# Patient Record
Sex: MALE | Race: BLACK OR AFRICAN AMERICAN | Employment: FULL TIME | URBAN - METROPOLITAN AREA
[De-identification: names, ages, dates, MRNs, and addresses within clinical notes are randomized per-mention and may not be internally consistent; named-entity substitution may affect disease eponyms.]

---

## 2017-01-03 ENCOUNTER — HOSPITAL ENCOUNTER (INPATIENT)
Age: 37
LOS: 2 days | Discharge: HOME | DRG: 247 | End: 2017-01-05
Attending: EMERGENCY MEDICINE | Admitting: INTERNAL MEDICINE
Payer: COMMERCIAL

## 2017-01-03 ENCOUNTER — APPOINTMENT (OUTPATIENT)
Dept: GENERAL RADIOLOGY | Age: 37
DRG: 247 | End: 2017-01-03
Payer: COMMERCIAL

## 2017-01-03 DIAGNOSIS — I24.9 ACS (ACUTE CORONARY SYNDROME) (HCC): Primary | ICD-10-CM

## 2017-01-03 PROBLEM — E78.5 HYPERLIPIDEMIA: Status: ACTIVE | Noted: 2017-01-03

## 2017-01-03 PROBLEM — I21.4 NSTEMI (NON-ST ELEVATED MYOCARDIAL INFARCTION) (HCC): Status: ACTIVE | Noted: 2017-01-03

## 2017-01-03 PROBLEM — R94.31 ABNORMAL EKG: Status: ACTIVE | Noted: 2017-01-03

## 2017-01-03 PROBLEM — E66.9 OBESITY: Status: ACTIVE | Noted: 2017-01-03

## 2017-01-03 PROBLEM — J45.909 ASTHMA: Status: ACTIVE | Noted: 2017-01-03

## 2017-01-03 LAB
ALBUMIN SERPL BCP-MCNC: 4 G/DL (ref 3.5–5)
ALBUMIN/GLOB SERPL: 1 {RATIO} (ref 1.2–3.5)
ALP SERPL-CCNC: 103 U/L (ref 50–136)
ALT SERPL-CCNC: 30 U/L (ref 12–65)
ANION GAP BLD CALC-SCNC: 9 MMOL/L (ref 7–16)
AST SERPL W P-5'-P-CCNC: 36 U/L (ref 15–37)
ATRIAL RATE: 66 BPM
BASOPHILS # BLD AUTO: 0 K/UL (ref 0–0.2)
BASOPHILS # BLD: 0 % (ref 0–2)
BILIRUB SERPL-MCNC: 0.6 MG/DL (ref 0.2–1.1)
BUN SERPL-MCNC: 22 MG/DL (ref 6–23)
CALCIUM SERPL-MCNC: 8.9 MG/DL (ref 8.3–10.4)
CALCULATED P AXIS, ECG09: 48 DEGREES
CALCULATED R AXIS, ECG10: -14 DEGREES
CALCULATED T AXIS, ECG11: -17 DEGREES
CHLORIDE SERPL-SCNC: 101 MMOL/L (ref 98–107)
CO2 SERPL-SCNC: 29 MMOL/L (ref 21–32)
CREAT SERPL-MCNC: 1.23 MG/DL (ref 0.8–1.5)
DIAGNOSIS, 93000: NORMAL
DIASTOLIC BP, ECG02: NORMAL MMHG
DIFFERENTIAL METHOD BLD: ABNORMAL
EOSINOPHIL # BLD: 0 K/UL (ref 0–0.8)
EOSINOPHIL NFR BLD: 0 % (ref 0.5–7.8)
ERYTHROCYTE [DISTWIDTH] IN BLOOD BY AUTOMATED COUNT: 12.5 % (ref 11.9–14.6)
GLOBULIN SER CALC-MCNC: 4 G/DL (ref 2.3–3.5)
GLUCOSE SERPL-MCNC: 87 MG/DL (ref 65–100)
HCT VFR BLD AUTO: 40.4 % (ref 41.1–50.3)
HGB BLD-MCNC: 13.2 G/DL (ref 13.6–17.2)
IMM GRANULOCYTES # BLD: 0 K/UL (ref 0–0.5)
IMM GRANULOCYTES NFR BLD AUTO: 0.3 % (ref 0–5)
LYMPHOCYTES # BLD AUTO: 29 % (ref 13–44)
LYMPHOCYTES # BLD: 3.5 K/UL (ref 0.5–4.6)
MCH RBC QN AUTO: 28.1 PG (ref 26.1–32.9)
MCHC RBC AUTO-ENTMCNC: 32.7 G/DL (ref 31.4–35)
MCV RBC AUTO: 86 FL (ref 79.6–97.8)
MONOCYTES # BLD: 0.9 K/UL (ref 0.1–1.3)
MONOCYTES NFR BLD AUTO: 7 % (ref 4–12)
NEUTS SEG # BLD: 7.3 K/UL (ref 1.7–8.2)
NEUTS SEG NFR BLD AUTO: 64 % (ref 43–78)
P-R INTERVAL, ECG05: 204 MS
PLATELET # BLD AUTO: 389 K/UL (ref 150–450)
PMV BLD AUTO: 8.3 FL (ref 10.8–14.1)
POTASSIUM SERPL-SCNC: 3.5 MMOL/L (ref 3.5–5.1)
PROT SERPL-MCNC: 8 G/DL (ref 6.3–8.2)
Q-T INTERVAL, ECG07: 414 MS
QRS DURATION, ECG06: 96 MS
QTC CALCULATION (BEZET), ECG08: 434 MS
RBC # BLD AUTO: 4.7 M/UL (ref 4.23–5.67)
SODIUM SERPL-SCNC: 139 MMOL/L (ref 136–145)
SYSTOLIC BP, ECG01: NORMAL MMHG
TROPONIN I SERPL-MCNC: 3.04 NG/ML (ref 0.02–0.05)
TROPONIN I SERPL-MCNC: 5.18 NG/ML (ref 0.02–0.05)
TSH SERPL DL<=0.005 MIU/L-ACNC: 1.31 UIU/ML (ref 0.36–3.74)
VENTRICULAR RATE, ECG03: 66 BPM
WBC # BLD AUTO: 11.8 K/UL (ref 4.3–11.1)

## 2017-01-03 PROCEDURE — 74011250636 HC RX REV CODE- 250/636

## 2017-01-03 PROCEDURE — 77030015766

## 2017-01-03 PROCEDURE — C1757 CATH, THROMBECTOMY/EMBOLECT: HCPCS

## 2017-01-03 PROCEDURE — 74011250636 HC RX REV CODE- 250/636: Performed by: EMERGENCY MEDICINE

## 2017-01-03 PROCEDURE — 74011250636 HC RX REV CODE- 250/636: Performed by: NURSE PRACTITIONER

## 2017-01-03 PROCEDURE — 74011250637 HC RX REV CODE- 250/637: Performed by: EMERGENCY MEDICINE

## 2017-01-03 PROCEDURE — 02C03ZZ EXTIRPATION OF MATTER FROM CORONARY ARTERY, ONE ARTERY, PERCUTANEOUS APPROACH: ICD-10-PCS | Performed by: INTERNAL MEDICINE

## 2017-01-03 PROCEDURE — B2151ZZ FLUOROSCOPY OF LEFT HEART USING LOW OSMOLAR CONTRAST: ICD-10-PCS | Performed by: INTERNAL MEDICINE

## 2017-01-03 PROCEDURE — 74011000250 HC RX REV CODE- 250: Performed by: INTERNAL MEDICINE

## 2017-01-03 PROCEDURE — 93005 ELECTROCARDIOGRAM TRACING: CPT | Performed by: INTERNAL MEDICINE

## 2017-01-03 PROCEDURE — 74011636320 HC RX REV CODE- 636/320: Performed by: INTERNAL MEDICINE

## 2017-01-03 PROCEDURE — 92973 PRQ TRLUML C MCHN ASP THRMBC: CPT

## 2017-01-03 PROCEDURE — 93005 ELECTROCARDIOGRAM TRACING: CPT

## 2017-01-03 PROCEDURE — C1769 GUIDE WIRE: HCPCS

## 2017-01-03 PROCEDURE — 74011250636 HC RX REV CODE- 250/636: Performed by: INTERNAL MEDICINE

## 2017-01-03 PROCEDURE — C1874 STENT, COATED/COV W/DEL SYS: HCPCS

## 2017-01-03 PROCEDURE — 96365 THER/PROPH/DIAG IV INF INIT: CPT

## 2017-01-03 PROCEDURE — 027135Z DILATION OF CORONARY ARTERY, TWO ARTERIES WITH TWO DRUG-ELUTING INTRALUMINAL DEVICES, PERCUTANEOUS APPROACH: ICD-10-PCS | Performed by: INTERNAL MEDICINE

## 2017-01-03 PROCEDURE — 96375 TX/PRO/DX INJ NEW DRUG ADDON: CPT

## 2017-01-03 PROCEDURE — 65660000000 HC RM CCU STEPDOWN

## 2017-01-03 PROCEDURE — 84443 ASSAY THYROID STIM HORMONE: CPT | Performed by: NURSE PRACTITIONER

## 2017-01-03 PROCEDURE — 71020 XR CHEST PA LAT: CPT

## 2017-01-03 PROCEDURE — 85347 COAGULATION TIME ACTIVATED: CPT | Performed by: INTERNAL MEDICINE

## 2017-01-03 PROCEDURE — 77030012468 HC VLV BLEEDBK CNTRL ABBT -B

## 2017-01-03 PROCEDURE — 80053 COMPREHEN METABOLIC PANEL: CPT

## 2017-01-03 PROCEDURE — C1887 CATHETER, GUIDING: HCPCS

## 2017-01-03 PROCEDURE — C1894 INTRO/SHEATH, NON-LASER: HCPCS

## 2017-01-03 PROCEDURE — 74011250637 HC RX REV CODE- 250/637: Performed by: INTERNAL MEDICINE

## 2017-01-03 PROCEDURE — 77030029997 HC DEV COM RDL R BND TELE -B

## 2017-01-03 PROCEDURE — 99285 EMERGENCY DEPT VISIT HI MDM: CPT

## 2017-01-03 PROCEDURE — 84484 ASSAY OF TROPONIN QUANT: CPT

## 2017-01-03 PROCEDURE — 74011250637 HC RX REV CODE- 250/637: Performed by: NURSE PRACTITIONER

## 2017-01-03 PROCEDURE — 92928 PRQ TCAT PLMT NTRAC ST 1 LES: CPT

## 2017-01-03 PROCEDURE — 92929 HC PLC DE STNT +/-PTA MAJOR COR VESL/BRNCH  ADD RC: CPT

## 2017-01-03 PROCEDURE — 4A023N7 MEASUREMENT OF CARDIAC SAMPLING AND PRESSURE, LEFT HEART, PERCUTANEOUS APPROACH: ICD-10-PCS | Performed by: INTERNAL MEDICINE

## 2017-01-03 PROCEDURE — B2111ZZ FLUOROSCOPY OF MULTIPLE CORONARY ARTERIES USING LOW OSMOLAR CONTRAST: ICD-10-PCS | Performed by: INTERNAL MEDICINE

## 2017-01-03 PROCEDURE — 77030004559 HC CATH ANGI DX SUPT CARD -B

## 2017-01-03 PROCEDURE — 36415 COLL VENOUS BLD VENIPUNCTURE: CPT | Performed by: NURSE PRACTITIONER

## 2017-01-03 PROCEDURE — 85025 COMPLETE CBC W/AUTO DIFF WBC: CPT

## 2017-01-03 PROCEDURE — 3E073PZ INTRODUCTION OF PLATELET INHIBITOR INTO CORONARY ARTERY, PERCUTANEOUS APPROACH: ICD-10-PCS | Performed by: INTERNAL MEDICINE

## 2017-01-03 PROCEDURE — 93458 L HRT ARTERY/VENTRICLE ANGIO: CPT

## 2017-01-03 PROCEDURE — 77030004558 HC CATH ANGI DX SUPR TORQ CARD -A

## 2017-01-03 PROCEDURE — C1725 CATH, TRANSLUMIN NON-LASER: HCPCS

## 2017-01-03 RX ORDER — SODIUM CHLORIDE 0.9 % (FLUSH) 0.9 %
5-10 SYRINGE (ML) INJECTION AS NEEDED
Status: DISCONTINUED | OUTPATIENT
Start: 2017-01-03 | End: 2017-01-05 | Stop reason: HOSPADM

## 2017-01-03 RX ORDER — SODIUM NITROPRUSSIDE 25 MG/ML
50 INJECTION INTRAVENOUS
Status: DISCONTINUED | OUTPATIENT
Start: 2017-01-03 | End: 2017-01-03 | Stop reason: HOSPADM

## 2017-01-03 RX ORDER — MIDAZOLAM HYDROCHLORIDE 1 MG/ML
.5-5 INJECTION, SOLUTION INTRAMUSCULAR; INTRAVENOUS
Status: DISCONTINUED | OUTPATIENT
Start: 2017-01-03 | End: 2017-01-03 | Stop reason: HOSPADM

## 2017-01-03 RX ORDER — PRASUGREL 10 MG/1
60 TABLET, FILM COATED ORAL ONCE
Status: COMPLETED | OUTPATIENT
Start: 2017-01-03 | End: 2017-01-03

## 2017-01-03 RX ORDER — GUAIFENESIN 100 MG/5ML
81 LIQUID (ML) ORAL DAILY
Status: DISCONTINUED | OUTPATIENT
Start: 2017-01-04 | End: 2017-01-05 | Stop reason: HOSPADM

## 2017-01-03 RX ORDER — SODIUM CHLORIDE 0.9 % (FLUSH) 0.9 %
5-10 SYRINGE (ML) INJECTION AS NEEDED
Status: DISCONTINUED | OUTPATIENT
Start: 2017-01-03 | End: 2017-01-03 | Stop reason: HOSPADM

## 2017-01-03 RX ORDER — GUAIFENESIN 100 MG/5ML
324 LIQUID (ML) ORAL ONCE
Status: COMPLETED | OUTPATIENT
Start: 2017-01-03 | End: 2017-01-03

## 2017-01-03 RX ORDER — ACETAMINOPHEN 325 MG/1
650 TABLET ORAL
Status: DISCONTINUED | OUTPATIENT
Start: 2017-01-03 | End: 2017-01-05 | Stop reason: HOSPADM

## 2017-01-03 RX ORDER — LIDOCAINE HYDROCHLORIDE 20 MG/ML
1-20 INJECTION, SOLUTION INFILTRATION; PERINEURAL
Status: DISCONTINUED | OUTPATIENT
Start: 2017-01-03 | End: 2017-01-03 | Stop reason: HOSPADM

## 2017-01-03 RX ORDER — EPTIFIBATIDE 0.75 MG/ML
2 INJECTION, SOLUTION INTRAVENOUS CONTINUOUS
Status: DISCONTINUED | OUTPATIENT
Start: 2017-01-03 | End: 2017-01-05 | Stop reason: HOSPADM

## 2017-01-03 RX ORDER — HEPARIN SODIUM 10000 [USP'U]/ML
40-80 INJECTION, SOLUTION INTRAVENOUS; SUBCUTANEOUS
Status: DISCONTINUED | OUTPATIENT
Start: 2017-01-03 | End: 2017-01-03 | Stop reason: HOSPADM

## 2017-01-03 RX ORDER — ATORVASTATIN CALCIUM 40 MG/1
80 TABLET, FILM COATED ORAL
Status: DISCONTINUED | OUTPATIENT
Start: 2017-01-03 | End: 2017-01-03

## 2017-01-03 RX ORDER — SODIUM CHLORIDE 9 MG/ML
75 INJECTION, SOLUTION INTRAVENOUS CONTINUOUS
Status: DISPENSED | OUTPATIENT
Start: 2017-01-03 | End: 2017-01-04

## 2017-01-03 RX ORDER — MORPHINE SULFATE 2 MG/ML
2 INJECTION, SOLUTION INTRAMUSCULAR; INTRAVENOUS
Status: DISCONTINUED | OUTPATIENT
Start: 2017-01-03 | End: 2017-01-05 | Stop reason: HOSPADM

## 2017-01-03 RX ORDER — LISINOPRIL 5 MG/1
5 TABLET ORAL 2 TIMES DAILY
Status: DISCONTINUED | OUTPATIENT
Start: 2017-01-03 | End: 2017-01-04

## 2017-01-03 RX ORDER — HEPARIN SODIUM 5000 [USP'U]/100ML
12-25 INJECTION, SOLUTION INTRAVENOUS
Status: DISCONTINUED | OUTPATIENT
Start: 2017-01-03 | End: 2017-01-05 | Stop reason: HOSPADM

## 2017-01-03 RX ORDER — HEPARIN SODIUM 5000 [USP'U]/100ML
12-25 INJECTION, SOLUTION INTRAVENOUS
Status: DISCONTINUED | OUTPATIENT
Start: 2017-01-03 | End: 2017-01-03 | Stop reason: SDUPTHER

## 2017-01-03 RX ORDER — ATORVASTATIN CALCIUM 40 MG/1
80 TABLET, FILM COATED ORAL DAILY
Status: DISCONTINUED | OUTPATIENT
Start: 2017-01-03 | End: 2017-01-05 | Stop reason: HOSPADM

## 2017-01-03 RX ORDER — SODIUM CHLORIDE 0.9 % (FLUSH) 0.9 %
5-10 SYRINGE (ML) INJECTION EVERY 8 HOURS
Status: DISCONTINUED | OUTPATIENT
Start: 2017-01-03 | End: 2017-01-03 | Stop reason: HOSPADM

## 2017-01-03 RX ORDER — NITROGLYCERIN 0.4 MG/1
0.4 TABLET SUBLINGUAL
Status: DISCONTINUED | OUTPATIENT
Start: 2017-01-03 | End: 2017-01-03

## 2017-01-03 RX ORDER — SODIUM CHLORIDE 9 MG/ML
100 INJECTION, SOLUTION INTRAVENOUS CONTINUOUS
Status: DISCONTINUED | OUTPATIENT
Start: 2017-01-03 | End: 2017-01-03 | Stop reason: HOSPADM

## 2017-01-03 RX ORDER — MORPHINE SULFATE 2 MG/ML
2 INJECTION, SOLUTION INTRAMUSCULAR; INTRAVENOUS
Status: DISCONTINUED | OUTPATIENT
Start: 2017-01-03 | End: 2017-01-03 | Stop reason: HOSPADM

## 2017-01-03 RX ORDER — SODIUM CHLORIDE 0.9 % (FLUSH) 0.9 %
5-10 SYRINGE (ML) INJECTION EVERY 8 HOURS
Status: DISCONTINUED | OUTPATIENT
Start: 2017-01-03 | End: 2017-01-05 | Stop reason: HOSPADM

## 2017-01-03 RX ORDER — FENTANYL CITRATE 50 UG/ML
25-100 INJECTION, SOLUTION INTRAMUSCULAR; INTRAVENOUS
Status: DISCONTINUED | OUTPATIENT
Start: 2017-01-03 | End: 2017-01-03 | Stop reason: HOSPADM

## 2017-01-03 RX ORDER — LORAZEPAM 1 MG/1
1 TABLET ORAL
Status: DISCONTINUED | OUTPATIENT
Start: 2017-01-03 | End: 2017-01-05 | Stop reason: HOSPADM

## 2017-01-03 RX ORDER — ALBUTEROL SULFATE 90 UG/1
2 AEROSOL, METERED RESPIRATORY (INHALATION)
COMMUNITY

## 2017-01-03 RX ORDER — NITROGLYCERIN 0.4 MG/1
0.4 TABLET SUBLINGUAL
Status: DISCONTINUED | OUTPATIENT
Start: 2017-01-03 | End: 2017-01-05 | Stop reason: HOSPADM

## 2017-01-03 RX ORDER — HEPARIN SODIUM 200 [USP'U]/100ML
3 INJECTION, SOLUTION INTRAVENOUS CONTINUOUS
Status: DISCONTINUED | OUTPATIENT
Start: 2017-01-03 | End: 2017-01-03 | Stop reason: HOSPADM

## 2017-01-03 RX ORDER — HEPARIN SODIUM 5000 [USP'U]/ML
4000 INJECTION, SOLUTION INTRAVENOUS; SUBCUTANEOUS
Status: COMPLETED | OUTPATIENT
Start: 2017-01-03 | End: 2017-01-03

## 2017-01-03 RX ORDER — EPTIFIBATIDE 2 MG/ML
180 INJECTION, SOLUTION INTRAVENOUS
Status: DISCONTINUED | OUTPATIENT
Start: 2017-01-03 | End: 2017-01-03 | Stop reason: HOSPADM

## 2017-01-03 RX ORDER — PRASUGREL 10 MG/1
10 TABLET, FILM COATED ORAL DAILY
Status: DISCONTINUED | OUTPATIENT
Start: 2017-01-04 | End: 2017-01-05 | Stop reason: HOSPADM

## 2017-01-03 RX ORDER — GUAIFENESIN 100 MG/5ML
81 LIQUID (ML) ORAL DAILY
Status: DISCONTINUED | OUTPATIENT
Start: 2017-01-04 | End: 2017-01-03 | Stop reason: SDUPTHER

## 2017-01-03 RX ORDER — METOPROLOL TARTRATE 25 MG/1
25 TABLET, FILM COATED ORAL 2 TIMES DAILY
Status: DISCONTINUED | OUTPATIENT
Start: 2017-01-03 | End: 2017-01-04

## 2017-01-03 RX ADMIN — EPTIFIBATIDE 2 MCG/KG/MIN: 0.75 INJECTION, SOLUTION INTRAVENOUS at 16:30

## 2017-01-03 RX ADMIN — MIDAZOLAM HYDROCHLORIDE 1 MG: 1 INJECTION, SOLUTION INTRAMUSCULAR; INTRAVENOUS at 15:03

## 2017-01-03 RX ADMIN — NITROGLYCERIN 1 INCH: 20 OINTMENT TOPICAL at 18:10

## 2017-01-03 RX ADMIN — ATORVASTATIN CALCIUM 80 MG: 40 TABLET, FILM COATED ORAL at 21:16

## 2017-01-03 RX ADMIN — EPTIFIBATIDE 2.9 MCG/KG/MIN: 0.75 INJECTION, SOLUTION INTRAVENOUS at 15:55

## 2017-01-03 RX ADMIN — MIDAZOLAM HYDROCHLORIDE 1 MG: 1 INJECTION, SOLUTION INTRAMUSCULAR; INTRAVENOUS at 14:50

## 2017-01-03 RX ADMIN — EPTIFIBATIDE 2.9 MCG/KG/MIN: 0.75 INJECTION, SOLUTION INTRAVENOUS at 15:31

## 2017-01-03 RX ADMIN — LIDOCAINE HYDROCHLORIDE 60 MG: 20 INJECTION, SOLUTION INFILTRATION; PERINEURAL at 14:55

## 2017-01-03 RX ADMIN — SODIUM NITROPRUSSIDE 0.01 MG: 50 INJECTION, SOLUTION, CONCENTRATE INTRAVENOUS at 16:34

## 2017-01-03 RX ADMIN — FENTANYL CITRATE 25 MCG: 50 INJECTION, SOLUTION INTRAMUSCULAR; INTRAVENOUS at 16:20

## 2017-01-03 RX ADMIN — HEPARIN SODIUM 2 ML: 10000 INJECTION, SOLUTION INTRAVENOUS; SUBCUTANEOUS at 14:55

## 2017-01-03 RX ADMIN — SODIUM CHLORIDE 100 ML/HR: 900 INJECTION, SOLUTION INTRAVENOUS at 14:48

## 2017-01-03 RX ADMIN — PRASUGREL HYDROCHLORIDE 60 MG: 10 TABLET, FILM COATED ORAL at 16:32

## 2017-01-03 RX ADMIN — HEPARIN SODIUM AND DEXTROSE 12 UNITS/KG/HR: 5000; 5 INJECTION INTRAVENOUS at 14:01

## 2017-01-03 RX ADMIN — HEPARIN SODIUM 2000 UNITS: 10000 INJECTION, SOLUTION INTRAVENOUS; SUBCUTANEOUS at 16:39

## 2017-01-03 RX ADMIN — HEPARIN SODIUM 5200 UNITS: 10000 INJECTION, SOLUTION INTRAVENOUS; SUBCUTANEOUS at 15:16

## 2017-01-03 RX ADMIN — LISINOPRIL 5 MG: 5 TABLET ORAL at 18:04

## 2017-01-03 RX ADMIN — HEPARIN SODIUM 2000 UNITS: 10000 INJECTION, SOLUTION INTRAVENOUS; SUBCUTANEOUS at 16:12

## 2017-01-03 RX ADMIN — HEPARIN SODIUM 3 ML/HR: 200 INJECTION, SOLUTION INTRAVENOUS at 14:56

## 2017-01-03 RX ADMIN — HEPARIN SODIUM 4000 UNITS: 5000 INJECTION, SOLUTION INTRAVENOUS; SUBCUTANEOUS at 14:00

## 2017-01-03 RX ADMIN — IOPAMIDOL 260 ML: 755 INJECTION, SOLUTION INTRAVENOUS at 16:41

## 2017-01-03 RX ADMIN — FENTANYL CITRATE 50 MCG: 50 INJECTION, SOLUTION INTRAMUSCULAR; INTRAVENOUS at 14:50

## 2017-01-03 RX ADMIN — Medication 5 ML: at 21:17

## 2017-01-03 RX ADMIN — METOPROLOL TARTRATE 25 MG: 25 TABLET ORAL at 18:05

## 2017-01-03 RX ADMIN — Medication 10 ML: at 18:16

## 2017-01-03 RX ADMIN — EPTIFIBATIDE 2 MCG/KG/MIN: 0.75 INJECTION, SOLUTION INTRAVENOUS at 22:01

## 2017-01-03 RX ADMIN — FENTANYL CITRATE 25 MCG: 50 INJECTION, SOLUTION INTRAMUSCULAR; INTRAVENOUS at 15:03

## 2017-01-03 RX ADMIN — ASPIRIN 81 MG 324 MG: 81 TABLET ORAL at 14:00

## 2017-01-03 RX ADMIN — EPTIFIBATIDE 1.93 MCG/KG/MIN: 0.75 INJECTION, SOLUTION INTRAVENOUS at 15:24

## 2017-01-03 RX ADMIN — NITROGLYCERIN 1 INCH: 20 OINTMENT TOPICAL at 13:59

## 2017-01-03 NOTE — PROGRESS NOTES
TRANSFER - IN REPORT:    Verbal report received from [place], RN on Whatley Forward  being received from [facility] for routine progression of care. Report consisted of patients Situation, Background, Assessment and   Recommendations(SBAR). Information from the following reports was reviewed: Kardex, Procedure Summary, MAR and Recent Results. Opportunity for questions and clarification was provided. Patient received to room 314 and connected to telemetry monitor. Assessment completed and plan of care reviewed. Right radial with TR band in place Site dry and intact without hematoma. BP cycling every 15 minutes. Patient oriented to room and call light. Patient voiced understanding to limit use of RUE. Patient voiced understanding to use call light to communicate needs. Admission skin assessment completed with second RN and reveals the following: Sacrum visualized, skin intact, no breakdown noted.

## 2017-01-03 NOTE — PROGRESS NOTES
TRANSFER - OUT REPORT:    Verbal report given to Edmundo Segura RN(name) on Jaden Quale  being transferred to [address] [place] [address](unit) for routine progression of care       Report consisted of patients Situation, Background, Assessment and   Recommendations(SBAR). Information from the following report(s) SBAR was reviewed with the receiving nurse.    has No Known Allergies. Opportunity for questions and clarification was provided. Procedure Summary:Pt had LHC with 2 stents placed in RCA via R wrist. Site sealed with R band using 10 ml at 1630 hrs.   Med Administration    Versed:  2 mg  Fentanyl: 100 mcg  Heparin: 9200 units  Integrilin: gtt and bolus x2    Visit Vitals    /80 (BP 1 Location: Left arm, BP Patient Position: Supine)    Pulse (!) 59    Temp 97.8 °F (36.6 °C)    Resp 16    Ht 6' 1\" (1.854 m)    Wt 129.3 kg (285 lb)    SpO2 100%    BMI 37.6 kg/m2     Past Medical History   Diagnosis Date    Asthma

## 2017-01-03 NOTE — PROCEDURES
Brief Cardiac Procedure Note    Patient: Radha Lopez MRN: [id number]  SSN: [id number]    YOB: 1980  Age: 31 y.o. Sex: male      Date of Procedure: 1/3/2017     Pre-procedure Diagnosis: Chest pain CCS Class IV/NSTEMI    Post-procedure Diagnosis: Coronary Artery Disease    Procedure: Left Heart Catheterization with Percutaneous Coronary Intervention    Brief Description of Procedure: ASPIRATION PDA AND PLB, PTCA STENT PDA, PTCA STENT PROX RCA    Performed By: Yocasta Yi MD     Assistants: NONE    Anesthesia: Moderate Sedation    Estimated Blood Loss: Less than 10 mL      Specimens: None    Implants: None    Findings:   LV 55-60% WITH MID MID INFERIOR HK, EDP 10-15, NO MR OR AV GRADIENT  LM, LAD, LCX MILD IRREGS  RCA HUGE WITH CLOT/70% STENOSIS PROX, OCCLUDED LARGE PDA    2.0 TREK PDA WITHOUT CHANGE  ASPIRATION X 2 WITH REMOVAL OF LARGE, LONG WHITE CLOT FROM PDA  SMALL PORTION OF CLOT WASHED INTO DISTAL MOST PLB, ASPIRATED TWICE WITHOUT IMPROVEMENT  STENTED 60% PDA LESION WITH 2.5 X 18 XIENCE, 2.75 TO 16, 16, 12 PROX TO DISTAL    STENTED PROX RCA 4.0 X 38 XIENCE TO 16  5. 0NC TO 12, 16, 16, 14 WITH GOOD RESULT  ARABELLA 3 FLOW DISTAL    RIGHT RADIAL  HEPARIN, EFFIENT, INTEGRILIN, ASA      Complications: None    Recommendations: Continue medical therapy.     Signed By: Yocasta Yi MD     January 3, 2017

## 2017-01-03 NOTE — PROGRESS NOTES
Bedside and Verbal shift change report received from [address]. Report included the following information SBAR, Kardex, ED Summary, Procedure Summary, Intake/Output, MAR, Recent Results and Cardiac Rhythm NSR.

## 2017-01-03 NOTE — ED PROVIDER NOTES
HPI Comments: 30-year-old gentleman with a history of pain in the left side of his chest that goes down his left arm that he says started last night. Patient says he saw his primary care doctor a few days ago and was diagnosed with a respiratory infection that was treated with azithromycin and steroids. He said he had a breathing treatment at that time as well. He says when he saw his primary care doctor he was not having any pain. He notes this pain that started last night and was a 1 or a 2 out of 10 and he says it has not gotten any more intense. Patient says that since his pain persisted he wanted to come in and get evaluated. Currently he says it is still a 1 or 2 out of 10. Patient says that his pain did not get any worse with walking from the car to the emergency department or walking in the emergency department. He says it did not seem to be relieved by rest.    Elements of this note were created using speech recognition software. As such, errors of speech recognition may be present. Patient is a 31 y.o. male presenting with palpitations. The history is provided by the patient. Palpitations    Associated symptoms include chest pain. Pertinent negatives include no diaphoresis, no fever, no abdominal pain, no nausea, no vomiting, no headaches, no dizziness, no weakness, no cough and no shortness of breath. Past Medical History:   Diagnosis Date    Asthma        History reviewed. No pertinent past surgical history. History reviewed. No pertinent family history. Social History     Social History    Marital status: SINGLE     Spouse name: N/A    Number of children: N/A    Years of education: N/A     Occupational History    Not on file.      Social History Main Topics    Smoking status: Never Smoker    Smokeless tobacco: Not on file    Alcohol use No    Drug use: Not on file    Sexual activity: Not on file     Other Topics Concern    Not on file     Social History Narrative  No narrative on file         ALLERGIES: Review of patient's allergies indicates no known allergies. Review of Systems   Constitutional: Negative for chills, diaphoresis and fever. HENT: Negative for congestion, rhinorrhea and sore throat. Eyes: Negative for redness and visual disturbance. Respiratory: Negative for cough, chest tightness, shortness of breath and wheezing. Cardiovascular: Positive for chest pain and palpitations. Gastrointestinal: Negative for abdominal pain, blood in stool, diarrhea, nausea and vomiting. Endocrine: Negative for polydipsia and polyuria. Genitourinary: Negative for dysuria and hematuria. Musculoskeletal: Negative for arthralgias, myalgias and neck stiffness. Skin: Negative for rash. Allergic/Immunologic: Negative for environmental allergies and food allergies. Neurological: Negative for dizziness, weakness and headaches. Hematological: Negative for adenopathy. Does not bruise/bleed easily. Psychiatric/Behavioral: Negative for confusion and sleep disturbance. The patient is not nervous/anxious. Vitals:    01/03/17 1230   BP: 119/84   Pulse: 66   Resp: 16   Temp: 97.8 °F (36.6 °C)   SpO2: 97%   Weight: 129.3 kg (285 lb)   Height: 6' 1\" (1.854 m)            Physical Exam   Constitutional: He is oriented to person, place, and time. He appears well-developed and well-nourished. HENT:   Head: Normocephalic and atraumatic. Eyes: Conjunctivae and EOM are normal. Pupils are equal, round, and reactive to light. Neck: Normal range of motion. Cardiovascular: Normal rate and regular rhythm. Pulmonary/Chest: Effort normal and breath sounds normal. No respiratory distress. He has no wheezes. He has no rales. He exhibits no tenderness. Abdominal: Soft. Bowel sounds are normal. There is no rebound and no guarding. Musculoskeletal: Normal range of motion. He exhibits no edema or tenderness. Lymphadenopathy:     He has no cervical adenopathy. Neurological: He is alert and oriented to person, place, and time. Skin: Skin is warm and dry. Psychiatric: He has a normal mood and affect. Nursing note and vitals reviewed. MDM  Number of Diagnoses or Management Options  Diagnosis management comments: Patient's initial EKG showed some inverted T waves but no ST segment changes. However, his first troponin came back at 3. Therefore, I discussed the case with cardiology who kindly agreed to see him.     ED Course       Procedures

## 2017-01-03 NOTE — PROCEDURES
[address]       Name:  Supriya Buckner   MR#:  [id number]   :  1980   Account #:  [de-identified]   Date of Adm:  2017       DATE OF PROCEDURE: 2017     PRIMARY CARE PHYSICIAN: Yovany Verma MD, at [facility]. REASON FOR PROCEDURE: Recurrent substernal chest pain with   inferolateral T-wave inversions and troponin of 3 in this 31  [facility]-old [facility] American male with no prior history who has had   stuttering chest pain ever since playing basketball yesterday   evening. PROCEDURE PERFORMED: Left heart catheterization with coronary   angiography and left ventriculogram, aspiration to the posterior   descending and posterolateral branches of the distal right   coronary artery with percutaneous transluminal coronary   angioplasty and stenting of the mid posterior descending branch   and the proximal right coronary using Xience drug-eluting   stents. TOTAL CONTRAST: 260 mL of Isovue. PROCEDURE TECHNIQUE: After informed consent was obtained, the   patient was brought to the cath lab and prepped and draped in   the usual fashion. A 6-[place] sheath was placed in the right   radial artery using a micropuncture modified Seldinger technique   and left heart catheterization performed using standard 5-[place]   angled pigtail and Tiger catheters. Percutaneous intervention   was performed through a 6-[place] JR4 guiding catheter utilizing   heparin and Integrilin for anticoagulation. Final ACT was 220   seconds and 2000 units of additional heparin was administered. The patient was loaded with 60 mg of Effient and Integrilin will   run for several hours post-intervention. He had aspirin in the   emergency department. PRESSURE RESULTS: Left ventricle 120 over 5-10. Aorta 120/80.     LEFT VENTRICULOGRAM: Reveals hypokinesis of the diaphragmatic   portion of the inferior wall, but the remainder of the ventricle   contracts normally yielding an ejection fraction estimate   greater than 55%. Left ventricular end-diastolic pressure is   normal. There is no mitral regurgitation and no aortic valve   gradient on catheter pullback. CORONARY ANATOMY: The left main is large and minimally   irregular, dividing into large LAD and circumflex vessels. The LAD is large caliber with streaming artifact and sluggish   endothelial dysfunction and ARABELLA 2 flow, but there is only mild   underlying coronary irregularities in the LAD and a large   diagonal.      The circumflex is a large bifurcating system, which also has   somewhat sluggish flow with only mild underlying luminal   irregularities throughout. The right coronary is a very large, anatomically dominant vessel   which divides into large posterior descending and smaller   posterolateral branches. There is sluggish flow throughout the   entire right coronary with a thrombotic lesion in the proximal   right coronary, which in the REES view appears to be on the order   of 70% to 80%. It appears that thrombus has washed downstream   and is totally occluding the mid posterior descending branch. PERCUTANEOUS INTERVENTION REPORT: After systemic anticoagulation   with heparin and Integrilin and verification of a therapeutic   ACT, a run-through wire was advanced into the distal posterior   descending branch and the site of total occlusion was predilated   gently with a 2 mm TREK balloon with no improvement in   angiographic appearance. We aspirated twice with a Priority 1 aspiration catheter and the   second time there was restoration of normal flow in the   posterior descending branch with extraction of a 15 mm white   clot. There is an underlying 60% to 70% stenosis in the portion   of the posterior descending branch where the thrombus was   located. Although, I think thrombus obviously embolized distally   from the proximal native right coronary artery.  We went ahead   and stented the posterior descending branch with a 2.5 x 18 mm   Xience drug-eluting stent and postdilated with a 2.75 mm   noncompliant TREK to 16 atmospheres in the proximal part of the   stent, 12 atmospheres in the mid part of the stent and 10   atmospheres distal most. There was less than 10% residual   stenosis, no dissection, and ARABELLA-3 flow. At the time of   aspiration catheter removal a small amount of clot wash down   into the distal most aspect of the posterolateral branch and   plugged the distal vessel. Aspiration on 2 separate occasions   was unsuccessful in resolving the clot. However, the occlusion   is distal and the patient had no angina or EKG changes, and this   will be left to medical therapy with long-term Integrilin. We finally stented the proximal right coronary with a 4.0 x 38   mm Xience drug-eluting stent to 16 atmospheres and post dilated   with a 5.0 x 12 mm noncompliant TREK balloon up to 16   atmospheres in the proximal and mid parts of the stent and 12   atmospheres distal most. There was less than 10% residual   stenosis, no dissection, and after Nipride, there was ARABELLA-3   flow. CONCLUSIONS: Complex percutaneous intervention of the right   coronary as described above with aspiration twice to both the   posterior descending and the posterolateral branches, stenting   of the mid posterior descending branch, and stenting of the   proximal right coronary in the setting of a non-ST elevation   myocardial infarction.         Janette Montoya MD      ATS / Eric Ward   D:  01/03/2017   17:04   T:  01/03/2017   18:08   Job #:  [id number]

## 2017-01-03 NOTE — H&P
[facility] CARDIOLOGY History &Physical                 Primary Cardiologist: ASHLI    Primary Care Physician: Dr. Tung King with [facility]    Admitting Physician: Dr. Tisha Martinez:     Patient is a 31 y.o. male with prior h/o Asthma, obesity HLP ( in 2015),  and recent URI (12/29/16) treated with Z pack, prednisone taper and robitussin DM. Took meds and felt better. Last night went and played basketball ~ 2 hours and came home and about 2-3 hours later started with left sided chest \"pressure\" that radiated down left arm. Scale 4/10. Went to bed and forgot about pain. Woke up this am and went to work but at work noticed heart palpitations and some mild chest soreness now scale 1/10 so came into ED for further eval.  In ED, labs with troponin 3.04, EKG with T wave inversions in anterolateral leads. Currently chest soreness scale 1/10. ASA given. Past Medical History   Diagnosis Date    Asthma       History reviewed. No pertinent past surgical history. No Known Allergies  Social History   Substance Use Topics    Smoking status: Never Smoker    Smokeless tobacco: Not on file    Alcohol use No      FH:   Family History   Problem Relation Age of Onset    No Known Problems Mother     No Known Problems Father         Review of Systems   Constitution: Negative for diaphoresis, weakness and malaise/fatigue. HENT: Negative for congestion and headaches. Cardiovascular: Positive for chest pain and irregular heartbeat. Negative for claudication, cyanosis, dyspnea on exertion, leg swelling, near-syncope, orthopnea, palpitations, paroxysmal nocturnal dyspnea and syncope.        + left arm pain   Respiratory: Negative for cough, shortness of breath and wheezing. Endocrine: Negative for cold intolerance and heat intolerance. Hematologic/Lymphatic: Does not bruise/bleed easily. Skin: Negative for nail changes. Neurological: Negative for dizziness.      ROS      Objective:       Visit Vitals    BP 119/84    Pulse 66    Temp 97.8 °F (36.6 °C)    Resp 16    Ht 6' 1\" (1.854 m)    Wt 129.3 kg (285 lb)    SpO2 97%    BMI 37.6 kg/m2               Physical Exam:  General: Well Developed, Well Nourished, No Acute Distress  HEENT: pupils equal and round, no abnormalities noted  Neck: supple, no JVD, no carotid bruits  Heart: S1S2 with RRR without murmurs or gallops  Lungs: Clear throughout auscultation bilaterally without adventitious sounds  Abd: soft, nontender, nondistended, with good bowel sounds  Ext: warm, no edema, calves supple/nontender, pulses 2+ bilaterally  Skin: warm and dry  Psychiatric: Normal mood and affect  Neurologic: Alert and oriented X 3      ECG: SR with inverted T waved in anterolateral leads    Data Review:   Recent Labs      01/03/17   1242   NA  139   K  3.5   BUN  22   CREA  1.23   GLU  87   WBC  11.8*   HGB  13.2*   HCT  40.4*   PLT  389   TROIQ  3.04*         CXR: No acute findings    Assessment/Plan:   Principal Problem:    NSTEMI (non-ST elevated myocardial infarction) ([facility] [address] (1/3/2017)- will admit to tele; [facility] later today. Will start heparin gtt, NTG paste, and statin. Melissa Lout already given. Borderline B/P so will hold BB and ACE-I for now. Serial troponins, lipid panel, TSH, BMP in am and echo. Active Problems:    Abnormal EKG (1/3/2017)- see above      Hyperlipidemia (1/3/2017)- LDL in 2015 was 189; statin as above      Obesity (1/3/2017)- diet/exercise    Asthma- continue home meds        Leon Carpenter NP  1/3/2017  1:47 PM    ATTENDING ADDENDUM:    Patient seen and examined by me. Agree with above note by physician extender. Key findings are:  Still with mild 1-2/10 SSCP with abnormal ECG inferolaterally and positive troponin upon arrival to ER consistent with NSTEMI. Symptoms started while playing basketball. Clinically stable, to cath lab now. The benefits and risks of left heart catheterization and possible percutaneous intervention were discussed with the patient. Risks including but not limited to bleeding, infection, contrast allergy reaction, acute kidney injury, MI, stroke, emergent CABG and death were discussed. The patient understands the risks of the procedure and wishes to proceed. CV- RRR without murmur  Lungs- Clear bilaterally  Abd- soft, nontender, nondistended  Ext- no edema    Plan: As above. Maximize meds post cath pending results.      Giorgi Merino MD  [facility] Cardiology  Pager [phone]

## 2017-01-03 NOTE — IP AVS SNAPSHOT
[address] 
[phone] Patient: Oriana Siddiqui MRN: [id number] PBM:8/4/1636 You are allergic to the following No active allergies Immunizations Administered for This Admission Name Date Influenza Vaccine (Quad) PF  Deferred () Recent Documentation Height Weight BMI Smoking Status 1.854 m 126 kg 36.65 kg/m2 Never Smoker Emergency Contacts Name Discharge Info Relation Home Work Mobile Padmini Multani  Spouse [3] [phone] About your hospitalization You were admitted on:  January 3, 2017 You last received care in the:  [facility] 3 TELEMETRY You were discharged on:  January 5, 2017 Unit phone number:  [phone] Why you were hospitalized Your primary diagnosis was:  Nstemi (Non-St Elevated Myocardial Infarction) (Hcc) Your diagnoses also included:  Abnormal Ekg, Hyperlipidemia, Obesity, Asthma Providers Seen During Your Hospitalizations Provider Role Specialty Primary office phone Nancy Rome MD Attending Provider Emergency Medicine [phone] Sasha Lorenzana MD Attending Provider Cardiology [phone] Your Primary Care Physician (PCP) Primary Care Physician Office Phone Office Fax OTHER, PHYS ** None ** ** None ** Follow-up Information Follow up With Details Comments Contact Info Manuel Calderon MD On 1/12/2017 Follow up with Dr. Fabrizio Palma on 1/12/17 @ 315 in the [place] office [address] [facility] Cardiology [facility] [address] 
[phone] Phys MD Raheem In 2 weeks [facility] follow up, Please call office for appointment Patient can only remember the practice name and not the physician Your Appointments Thursday January 12, 2017  3:15 PM EST TRANSITIONAL CARE MANAGEMENT with Manuel Calderon MD  
[facility] Cardiology ([address]) [address]  
[address] [place] Alman[address]  
[phone] Current Discharge Medication List  
  
START taking these medications Dose & Instructions Dispensing Information Comments Morning Noon Evening Bedtime  
 aspirin 81 mg chewable tablet Your next dose is: Today, Tomorrow Other:  _________ Dose:  81 mg Take 1 Tab by mouth daily. Refills:  0  
     
   
   
   
  
 atorvastatin 80 mg tablet Commonly known as:  LIPITOR Your next dose is: Today, Tomorrow Other:  _________ Dose:  80 mg Take 1 Tab by mouth nightly. Quantity:  30 Tab Refills:  11  
     
   
   
   
  
 lisinopril 2.5 mg tablet Commonly known as:  Morris Escort Your next dose is: Today, Tomorrow Other:  _________ Dose:  2.5 mg Take 1 Tab by mouth daily. Quantity:  30 Tab Refills:  11  
     
   
   
   
  
 metoprolol succinate 25 mg XL tablet Commonly known as:  TOPROL-XL Your next dose is: Today, Tomorrow Other:  _________ Dose:  25 mg Take 1 Tab by mouth daily. Quantity:  30 Tab Refills:  11  
     
   
   
   
  
 nitroglycerin 0.4 mg SL tablet Commonly known as:  NITROSTAT Your next dose is: Today, Tomorrow Other:  _________ Dose:  0.4 mg  
1 Tab by SubLINGual route every five (5) minutes as needed for Chest Pain. Quantity:  1 Bottle Refills:  5  
     
   
   
   
  
 prasugrel 10 mg tablet Commonly known as:  EFFIENT Your next dose is: Today, Tomorrow Other:  _________ Dose:  10 mg Take 1 Tab by mouth daily. Quantity:  30 Tab Refills:  11 CONTINUE these medications which have NOT CHANGED Dose & Instructions Dispensing Information Comments Morning Noon Evening Bedtime PROAIR HFA 90 mcg/actuation inhaler Generic drug:  albuterol Your next dose is: Today, Tomorrow Other:  _________ Dose:  2 Puff Take 2 Puffs by inhalation every six (6) hours as needed for Wheezing. Indications: Acute Asthma Attack Refills:  0 Where to Get Your Medications Information on where to get these meds will be given to you by the nurse or doctor. ! Ask your nurse or doctor about these medications  
  atorvastatin 80 mg tablet  
 lisinopril 2.5 mg tablet  
 metoprolol succinate 25 mg XL tablet  
 nitroglycerin 0.4 mg SL tablet  
 prasugrel 10 mg tablet Discharge Instructions DISCHARGE SUMMARY from Nurse The following personal items are in your possession at time of discharge: 
 
Dental Appliances: None Visual Aid: None Home Medications: Kept at bedside Jewelry: Ring Clothing: At bedside, Footwear, Jacket/Coat, Pants, Shirt, Socks, Undergarments Other Valuables: Cell Phone, Pocket Concierge Personal Items Sent to Safe: none PATIENT INSTRUCTIONS: 
 
 
F-face looks uneven A-arms unable to move or move unevenly S-speech slurred or non-existent T-time-call 911 as soon as signs and symptoms begin-DO NOT go Back to bed or wait to see if you get better-TIME IS BRAIN. Warning Signs of HEART ATTACK Call 911 if you have these symptoms: 
? Chest discomfort. Most heart attacks involve discomfort in the center of the chest that lasts more than a few minutes, or that goes away and comes back. It can feel like uncomfortable pressure, squeezing, fullness, or pain. ? Discomfort in other areas of the upper body.  Symptoms can include pain or discomfort in one or both arms, the back, neck, jaw, or stomach. ? Shortness of breath with or without chest discomfort. ? Other signs may include breaking out in a cold sweat, nausea, or lightheadedness. Don't wait more than five minutes to call [address]! Fast action can save your life. Calling 911 is almost always the fastest way to get lifesaving treatment. Emergency Medical Services staff can begin treatment when they arrive  up to an hour sooner than if someone gets to the hospital by car. The discharge information has been reviewed with the patient. The patient verbalized understanding. Discharge medications reviewed with the patient and appropriate educational materials and side effects teaching were provided. Percutaneous Coronary Intervention: What to Expect at [facility] Your Recovery Percutaneous coronary intervention (PCI) is the name for procedures that are used to open a narrowed or blocked coronary artery. The two most common PCI procedures are coronary angioplasty and coronary stent placement. Your groin or arm may have a bruise and feel sore for a day or two after a percutaneous coronary intervention (PCI). You can do light activities around the house, but nothing strenuous for several days. This care sheet gives you a general idea about how long it will take for you to recover. But each person recovers at a different pace. Follow the steps below to get better as quickly as possible. How can you care for yourself at home? Activity · Do not do strenuous exercise and do not lift, pull, or push anything heavy until your doctor says it is okay. This may be for a day or two. You can walk around the house and do light activity, such as cooking. · You may shower 24 to 48 hours after the procedure, if your doctor okays it. Pat the incision dry. Do not take a bath for 1 week, or until your doctor tells you it is okay. · If the catheter was placed in your groin, try not to walk up stairs for the first couple of days. · If the catheter was placed in your arm near your wrist, do not bend your wrist deeply for the first couple of days. Be careful using your hand to get into and out of a chair or bed. · If your doctor recommends it, get more exercise. Walking is a good choice. Bit by bit, increase the amount you walk every day. Try for at least 30 minutes on most days of the week. Diet · Drink plenty of fluids to help your body flush out the dye. If you have kidney, heart, or liver disease and have to limit fluids, talk with your doctor before you increase the amount of fluids you drink. · Keep eating a heart-healthy diet that has lots of fruits, vegetables, and whole grains. If you have not been eating this way, talk to your doctor. You also may want to talk to a dietitian. This expert can help you to learn about healthy foods and plan meals. Medicines · Your doctor will tell you if and when you can restart your medicines. He or she will also give you instructions about taking any new medicines. · If you take blood thinners, such as warfarin (Coumadin), clopidogrel (Plavix), or aspirin, be sure to talk to your doctor. He or she will tell you if and when to start taking those medicines again. Make sure that you understand exactly what your doctor wants you to do. · Your doctor will prescribe blood-thinning medicines. You will likely take aspirin plus another antiplatelet, such as clopidogrel (Plavix). It is very important that you take these medicines exactly as directed. These medicines help keep the coronary artery open and reduce your risk of a heart attack. · Call your doctor if you think you are having a problem with your medicine. Care of the catheter site · For 1 or 2 days, keep a bandage over the spot where the catheter was inserted. The bandage probably will fall off in this time. · Put ice or a cold pack on the area for 10 to 20 minutes at a time to help with soreness or swelling. Put a thin cloth between the ice and your skin. Follow-up care is a key part of your treatment and safety. Be sure to make and go to all appointments, and call your doctor if you are having problems. It's also a good idea to know your test results and keep a list of the medicines you take. When should you call for help? Call 911 anytime you think you may need emergency care. For example, call if: 
· You passed out (lost consciousness). · You have severe trouble breathing. · You have sudden chest pain and shortness of breath, or you cough up blood. · You have symptoms of a heart attack, such as: ¨ Chest pain or pressure. ¨ Sweating. ¨ Shortness of breath. ¨ Nausea or vomiting. ¨ Pain that spreads from the chest to the neck, jaw, or one or both shoulders or arms. ¨ Dizziness or lightheadedness. ¨ A fast or uneven pulse. After calling 911, chew 1 adult-strength aspirin. Wait for an ambulance. Do not try to drive yourself. · You have been diagnosed with angina, and you have angina symptoms that do not go away with rest or are not getting better within 5 minutes after you take one dose of nitroglycerin. Call your doctor now or seek immediate medical care if: 
· You are bleeding from the area where the catheter was put in your artery. · You have a fast-growing, painful lump at the catheter site. · You have signs of infection, such as: 
¨ Increased pain, swelling, warmth, or redness. ¨ Red streaks leading from the catheter site. ¨ Pus draining from the catheter site. ¨ A fever. · Your leg or arm looks blue or feels cold, numb, or tingly. Watch closely for changes in your health, and be sure to contact your doctor if you have any problems. Where can you learn more? Go to http://tim-salome.info/.  
Enter R051 in the search box to learn more about \"Percutaneous Coronary Intervention: What to Expect at Home. \" Current as of: January 27, 2016 Content Version: 11.1 © 9898-2741 [employer]. Care instructions adapted under license by [employer] (which disclaims liability or warranty for this information). If you have questions about a medical condition or this instruction, always ask your healthcare professional. [address] any warranty or liability for your use of this information. Heart Attack: Care Instructions Your Care Instructions A heart attack (myocardial infarction, or MI) occurs when one or more of the coronary arteries, which supply the heart with oxygen-rich blood, is blocked. A blockage usually occurs when plaque inside the artery breaks open and a blood clot forms in the artery. After a heart attack, you may be worried about your future. Over the next several weeks, your heart will start to heal. Though it can be hard to break old habits, you can prevent another heart attack by making some lifestyle changes and by taking medicines. You may use this information for ideas about what to do at home to speed your recovery. Follow-up care is a key part of your treatment and safety. Be sure to make and go to all appointments, and call your doctor if you are having problems. It's also a good idea to know your test results and keep a list of the medicines you take. How can you care for yourself at home? Activity · Until your doctor says it is okay, do not do strenuous exercise. And do not lift, pull, or push anything heavy. Ask your doctor what types of activities are safe for you. · If your doctor has not set you up with a cardiac rehabilitation (rehab) program, talk to him or her about whether that is right for you. Cardiac rehab includes supervised exercise. It also includes help with diet and lifestyle changes and emotional support. It may reduce your risk of future heart problems. · Increase your activities slowly. Take short rest breaks when you get tired. · If your doctor recommends it, get more exercise. Walking is a good choice. Bit by bit, increase the amount you walk every day. Try for at least 30 minutes on most days of the week. You also may want to swim, bike, or do other activities. Talk with your doctor before you start an exercise program to make sure it is safe for you. · Ask your doctor when you can drive, go back to work, and do other daily activities again. · You can have sex as soon as you feel ready for it. Often this means when you can easily walk around or climb stairs. Talk with your doctor if you have any concerns. If you are taking nitroglycerin, do not take erection-enhancing medicine such as sildenafil (Viagra), tadalafil (Cialis), or vardenafil (Levitra) . Lifestyle changes · Do not smoke. Smoking increases your risk of another heart attack. If you need help quitting, talk to your doctor about stop-smoking programs and medicines. These can increase your chances of quitting for good. · Eat a heart-healthy diet that is low in saturated fat and salt, and is full of fruits, vegetables and whole-grains. Eat at least two servings of fish each week. You may get more details about how to eat healthy. But these tips can help you get started. · Stay at a healthy weight, or lose weight if you need to. Medicines · Be safe with medicines. Take your medicines exactly as prescribed. Call your doctor if you think you are having a problem with your medicine. You will get more details on the specific medicines your doctor prescribes. Do not stop taking your medicine unless your doctor tells you to. Not taking your medicine might raise your risk of having another heart attack. · You may need several medicines to help lower your risk of another heart attack. These include: ¨ Blood pressure medicines such as angiotensin-converting enzyme (ACE) inhibitors, ARBs (angiotensin II receptor blockers), and beta-blockers. ¨ Cholesterol medicine called statins. ¨ Aspirin and other blood thinners. These prevent blood clots that can cause a heart attack. · If your doctor has given you nitroglycerin, keep it with you at all times. If you have angina symptoms, such as chest pain or pressure, sit down and rest. Take the first dose of nitroglycerin as directed. If symptoms get worse or are not getting better within 5 minutes, call 911 right away. Stay on the phone. The emergency  will tell you what to do. · Do not take any over-the-counter medicines, vitamins, or herbal products without talking to your doctor first. 
Staying healthy · Manage other health conditions such as high blood pressure and diabetes. · Avoid colds and flu. Get a pneumococcal vaccine shot. If you have had one before, ask your doctor whether you need another dose. Get the flu vaccine every year. If you must be around people with colds or flu, wash your hands often. · Be sure to tell your doctor about any angina symptoms you have had, even if they went away. Pay attention to your angina symptoms. Know what is typical for you and learn how to control it. Know when to call for help. · Talk to your family, friends, or a counselor about your feelings. It is normal to feel frightened, angry, hopeless, helpless, and even guilty. Talking openly about bad feelings can help you cope. If you have symptoms of depression, talk to your doctor. When should you call for help? Call 911 anytime you think you may need emergency care. For example, call if: 
· You have symptoms of a heart attack. These may include: ¨ Chest pain or pressure, or a strange feeling in the chest. 
¨ Sweating. ¨ Shortness of breath. ¨ Nausea or vomiting. ¨ Pain, pressure, or a strange feeling in the back, neck, jaw, or upper belly or in one or both shoulders or arms. ¨ Lightheadedness or sudden weakness. ¨ A fast or irregular heartbeat. After you call 911, the  may tell you to chew 1 adult-strength or 2 to 4 low-dose aspirin. Wait for an ambulance. Do not try to drive yourself. · You have angina symptoms (such as chest pain or pressure) that do not go away with rest or are not getting better within 5 minutes after you take a dose of nitroglycerin. · You passed out (lost consciousness). · You feel like you are having another heart attack. Call your doctor now or seek immediate medical care if: 
· You are having angina symptoms, such as chest pain or pressure, more often than usual, or the symptoms are different or worse than usual. 
· You have new or increased shortness of breath. · You are dizzy or lightheaded, or you feel like you may faint. Watch closely for changes in your health, and be sure to contact your doctor if you have any problems. Where can you learn more? Go to http://timAccess Networksalome.info/. Enter [id number] in the search box to learn more about \"Heart Attack: Care Instructions. \" Current as of: January 27, 2016 Content Version: 11.1 © 7188-6680 [employer]. Care instructions adapted under license by [employer] (which disclaims liability or warranty for this information). If you have questions about a medical condition or this instruction, always ask your healthcare professional. [place] [address] any warranty or liability for your use of this information. Reducing Heart Attack Risk With Daily Medicine: Care Instructions Your Care Instructions Heart disease is the number one cause of death. If you are at risk for heart disease, there are many medicines that can reduce your risk. These include: · ACE inhibitors. These are a type of blood pressure medicine. They can reduce the risk of heart attacks and strokes if you are at high risk. · Statin medicines. These lower cholesterol.  They can also reduce the risk of heart disease and strokes. · Aspirin. It can help certain people lower their risk of a heart attack or stroke. · Beta-blocker medicines. These are a type of blood pressure and heart medicine. They can reduce the chance of early death if you have had a heart attack. All medicines can cause side effects. So it is important to understand the pros and cons of any medicine you take. It is also important to take your medicines exactly as your doctor tells you to. Follow-up care is a key part of your treatment and safety. Be sure to make and go to all appointments, and call your doctor if you are having problems. It's also a good idea to know your test results and keep a list of the medicines you take. ACE inhibitors ACE (angiotensin-converting enzyme) inhibitors are used for three main reasons. They lower blood pressure, protect the kidneys, and prevent heart attacks and strokes. Examples include benazepril (Lotensin), lisinopril (Prinivil, Zestril), and ramipril (Altace). Before you start taking an ACE inhibitor, make sure your doctor knows if: 
· You are taking a water pill (diuretic). · You are taking potassium pills or using salt substitutes. · You are pregnant or breastfeeding. · You have had a kidney transplant or other kidney problems. ACE inhibitors can cause side effects. Call your doctor right away if you have: · Trouble breathing. · Swelling in your face, head, neck, or tongue. · Dizziness or lightheadedness. · A dry cough. Statins Statins lower cholesterol. Examples include atorvastatin (Lipitor), lovastatin (Mevacor), pravastatin (Pravachol), and simvastatin (Zocor). Before you start taking a statin, make sure your doctor knows if: 
· You have had a kidney transplant or other kidney problems. · You have liver disease. · You take any other prescription medicine, over-the-counter medicine, vitamins, supplements, or herbal remedies. · You are pregnant or breastfeeding. Statins can cause side effects. Call your doctor right away if you have: · New, severe muscle aches. · Brown urine. Aspirin Taking an aspirin every day can lower your risk for a heart attack. A heart attack occurs when a blood vessel in the heart gets blocked. When this happens, oxygen can't get to the heart muscle, and part of the heart dies. Aspirin can help prevent blood clots that can block the blood vessels. Talk to your doctor before you start taking aspirin every day. He or she may recommend that you take one low-dose aspirin (81 mg) tablet each day, with a meal and a full glass of water. Taking aspirin isn't right for everyone, because it can cause serious bleeding. And you may not be able to use aspirin if you: 
· Have asthma. · Have an ulcer or other stomach problem. · Take some other medicine (called a blood thinner) that prevents blood clots. · Are allergic to aspirin. Before having a surgery or procedure, tell your doctor or dentist that you take aspirin. He or she will tell you if you should stop taking aspirin beforehand. Make sure that you understand exactly what your doctor wants you to do. Aspirin can cause side effects. Call your doctor right away if you have: · Unusual bleeding or bruising. · Nausea, vomiting, or heartburn. · Black or bloody stools. Beta-blockers Beta-blockers are used for three main reasons. They lower blood pressure, relieve angina symptoms (such as chest pain or pressure), and reduce the chances of a second heart attack. They include atenolol (Tenormin), carvedilol (Coreg), and metoprolol (Lopressor). Before you start taking a beta-blocker, make sure your doctor knows if you have: · Severe asthma or frequent asthma attacks. · A very slow pulse (less than 55 beats a minute). Beta-blockers can cause side effects. Call your doctor right away if you have: · Wheezing or trouble breathing. · Dizziness or lightheadedness. · Asthma that gets worse. When should you call for help? Call 911 anytime you think you may need emergency care. For example, call if: 
· You passed out (lost consciousness). Call your doctor now or seek immediate medical care if: 
· You are wheezing or have trouble breathing. · You have swelling in your face, head, neck, or tongue. · You are dizzy or lightheaded, or you feel like you may faint. · You have severe muscle pain, weakness, or brown urine. · You have vision problems. · You have new bruises or blood spots under your skin. · Your stools are black and tarlike or have streaks of blood. Watch closely for changes in your health, and be sure to contact your doctor if: 
· You have ringing in your ears. · You feel very tired. · You have gas, constipation, or an upset stomach. Where can you learn more? Go to http://tim-salome.info/. Enter R428 in the search box to learn more about \"Reducing Heart Attack Risk With Daily Medicine: Care Instructions. \" Current as of: March 28, 2016 Content Version: 11.1 © 8757-9697 [employer]. Care instructions adapted under license by [employer] (which disclaims liability or warranty for this information). If you have questions about a medical condition or this instruction, always ask your healthcare professional. [place] [address] any warranty or liability for your use of this information. Discharge Orders Procedure Order Date Status Priority Quantity Spec Type Associated Dx REFERRAL TO CARDIAC [facility] 01/05/17 0805 Normal Routine 1 "[facility]" Announcement We are excited to announce that we are making your provider's discharge notes available to you in "[facility]". You will see these notes when they are completed and signed by the physician that discharged you from your recent hospital stay.   If you have any questions or concerns about any information you see in "[facility] [place]", please call the [facility] [place] [facility] where you were seen or reach out to your Primary Care Provider for more information about your plan of care. Introducing [facility]! [employer] introduces [employer] patient portal. Now you can access parts of your medical record, email your doctor's office, and request medication refills online. 1. In your internet browser, go to [email or web address] 2. Click on the First Time User? Click Here link in the Sign In box. You will see the New Member Sign Up page. 3. Enter your [employer] Access Code exactly as it appears below. You will not need to use this code after youve completed the sign-up process. If you do not sign up before the expiration date, you must request a new code. · [employer] Access Code: [email or web address] Expires: 4/3/2017 12:46 PM 
 
4. Enter the last four digits of your Social Security Number (xxxx) and Date of Birth (mm/dd/yyyy) as indicated and click Submit. You will be taken to the next sign-up page. 5. Create a Neozone ID. This will be your [employer] login ID and cannot be changed, so think of one that is secure and easy to remember. 6. Create a Neozone password. You can change your password at any time. 7. Enter your Password Reset Question and Answer. This can be used at a later time if you forget your password. 8. Enter your e-mail address. You will receive e-mail notification when new information is available in [address] 9. Click Sign Up. You can now view and download portions of your medical record. 10. Click the Download Summary menu link to download a portable copy of your medical information. If you have questions, please visit the Frequently Asked Questions section of the Neozone website. Remember, [employer] is NOT to be used for urgent needs. For medical emergencies, dial 911. Now available from your iPhone and Android! General Information Please provide this summary of care documentation to your next provider. Patient Signature:  ____________________________________________________________ Date:  ____________________________________________________________  
  
Loreta Gris Provider Signature:  ____________________________________________________________ Date:  ____________________________________________________________

## 2017-01-03 NOTE — PROGRESS NOTES
Applied R-band to right wrist with 11 mL of air in band. Site without bleeding or hematoma. Capillary refill distal to the site was less than 2 seconds. Patient instructed to limit movement of affected wrist. Patient verbalized understanding.

## 2017-01-04 LAB
ANION GAP BLD CALC-SCNC: 8 MMOL/L (ref 7–16)
ATRIAL RATE: 63 BPM
ATRIAL RATE: 66 BPM
BASOPHILS # BLD AUTO: 0 K/UL (ref 0–0.2)
BASOPHILS # BLD: 0 % (ref 0–2)
BUN SERPL-MCNC: 20 MG/DL (ref 6–23)
CALCIUM SERPL-MCNC: 8.1 MG/DL (ref 8.3–10.4)
CALCULATED P AXIS, ECG09: 48 DEGREES
CALCULATED P AXIS, ECG09: 61 DEGREES
CALCULATED R AXIS, ECG10: -10 DEGREES
CALCULATED R AXIS, ECG10: -28 DEGREES
CALCULATED T AXIS, ECG11: -18 DEGREES
CALCULATED T AXIS, ECG11: -24 DEGREES
CHLORIDE SERPL-SCNC: 107 MMOL/L (ref 98–107)
CHOLEST SERPL-MCNC: 215 MG/DL
CO2 SERPL-SCNC: 28 MMOL/L (ref 21–32)
CREAT SERPL-MCNC: 1.17 MG/DL (ref 0.8–1.5)
DIAGNOSIS, 93000: NORMAL
DIAGNOSIS, 93000: NORMAL
DIASTOLIC BP, ECG02: NORMAL MMHG
DIASTOLIC BP, ECG02: NORMAL MMHG
DIFFERENTIAL METHOD BLD: ABNORMAL
EOSINOPHIL # BLD: 0.1 K/UL (ref 0–0.8)
EOSINOPHIL NFR BLD: 1 % (ref 0.5–7.8)
ERYTHROCYTE [DISTWIDTH] IN BLOOD BY AUTOMATED COUNT: 12.9 % (ref 11.9–14.6)
GLUCOSE SERPL-MCNC: 98 MG/DL (ref 65–100)
HCT VFR BLD AUTO: 39.5 % (ref 41.1–50.3)
HDLC SERPL-MCNC: 37 MG/DL (ref 40–60)
HDLC SERPL: 5.8 {RATIO}
HGB BLD-MCNC: 12.5 G/DL (ref 13.6–17.2)
IMM GRANULOCYTES # BLD: 0.1 K/UL (ref 0–0.5)
IMM GRANULOCYTES NFR BLD AUTO: 0.9 % (ref 0–5)
LDLC SERPL CALC-MCNC: 153.8 MG/DL
LIPID PROFILE,FLP: ABNORMAL
LYMPHOCYTES # BLD AUTO: 47 % (ref 13–44)
LYMPHOCYTES # BLD: 4.1 K/UL (ref 0.5–4.6)
MCH RBC QN AUTO: 28.1 PG (ref 26.1–32.9)
MCHC RBC AUTO-ENTMCNC: 31.6 G/DL (ref 31.4–35)
MCV RBC AUTO: 88.8 FL (ref 79.6–97.8)
MONOCYTES # BLD: 0.5 K/UL (ref 0.1–1.3)
MONOCYTES NFR BLD AUTO: 5 % (ref 4–12)
NEUTS SEG # BLD: 4 K/UL (ref 1.7–8.2)
NEUTS SEG NFR BLD AUTO: 46 % (ref 43–78)
P-R INTERVAL, ECG05: 192 MS
P-R INTERVAL, ECG05: 208 MS
PLATELET # BLD AUTO: 314 K/UL (ref 150–450)
PMV BLD AUTO: 8.7 FL (ref 10.8–14.1)
POTASSIUM SERPL-SCNC: 4.1 MMOL/L (ref 3.5–5.1)
Q-T INTERVAL, ECG07: 410 MS
Q-T INTERVAL, ECG07: 434 MS
QRS DURATION, ECG06: 94 MS
QRS DURATION, ECG06: 94 MS
QTC CALCULATION (BEZET), ECG08: 429 MS
QTC CALCULATION (BEZET), ECG08: 444 MS
RBC # BLD AUTO: 4.45 M/UL (ref 4.23–5.67)
SODIUM SERPL-SCNC: 143 MMOL/L (ref 136–145)
SYSTOLIC BP, ECG01: NORMAL MMHG
SYSTOLIC BP, ECG01: NORMAL MMHG
TRIGL SERPL-MCNC: 121 MG/DL (ref 35–150)
TROPONIN I SERPL-MCNC: 4.4 NG/ML (ref 0.02–0.05)
VENTRICULAR RATE, ECG03: 63 BPM
VENTRICULAR RATE, ECG03: 66 BPM
VLDLC SERPL CALC-MCNC: 24.2 MG/DL (ref 6–23)
WBC # BLD AUTO: 8.7 K/UL (ref 4.3–11.1)

## 2017-01-04 PROCEDURE — 80061 LIPID PANEL: CPT | Performed by: NURSE PRACTITIONER

## 2017-01-04 PROCEDURE — 80048 BASIC METABOLIC PNL TOTAL CA: CPT | Performed by: NURSE PRACTITIONER

## 2017-01-04 PROCEDURE — 85025 COMPLETE CBC W/AUTO DIFF WBC: CPT | Performed by: NURSE PRACTITIONER

## 2017-01-04 PROCEDURE — 93005 ELECTROCARDIOGRAM TRACING: CPT | Performed by: INTERNAL MEDICINE

## 2017-01-04 PROCEDURE — 74011000250 HC RX REV CODE- 250: Performed by: INTERNAL MEDICINE

## 2017-01-04 PROCEDURE — 84484 ASSAY OF TROPONIN QUANT: CPT | Performed by: NURSE PRACTITIONER

## 2017-01-04 PROCEDURE — 92929 HC PLC DE STNT +/-PTA MAJOR COR VESL/BRNCH  ADD RC: CPT

## 2017-01-04 PROCEDURE — C8929 TTE W OR WO FOL WCON,DOPPLER: HCPCS

## 2017-01-04 PROCEDURE — 65660000000 HC RM CCU STEPDOWN

## 2017-01-04 PROCEDURE — 36415 COLL VENOUS BLD VENIPUNCTURE: CPT | Performed by: NURSE PRACTITIONER

## 2017-01-04 PROCEDURE — 74011250637 HC RX REV CODE- 250/637: Performed by: INTERNAL MEDICINE

## 2017-01-04 PROCEDURE — 74011250637 HC RX REV CODE- 250/637: Performed by: NURSE PRACTITIONER

## 2017-01-04 PROCEDURE — 74011250636 HC RX REV CODE- 250/636: Performed by: INTERNAL MEDICINE

## 2017-01-04 RX ORDER — LISINOPRIL 5 MG/1
5 TABLET ORAL DAILY
Status: DISCONTINUED | OUTPATIENT
Start: 2017-01-05 | End: 2017-01-05

## 2017-01-04 RX ORDER — METOPROLOL SUCCINATE 25 MG/1
25 TABLET, EXTENDED RELEASE ORAL DAILY
Status: DISCONTINUED | OUTPATIENT
Start: 2017-01-05 | End: 2017-01-05 | Stop reason: HOSPADM

## 2017-01-04 RX ADMIN — Medication 5 ML: at 20:26

## 2017-01-04 RX ADMIN — ASPIRIN 81 MG: 81 TABLET, CHEWABLE ORAL at 10:01

## 2017-01-04 RX ADMIN — METOPROLOL TARTRATE 25 MG: 25 TABLET ORAL at 10:01

## 2017-01-04 RX ADMIN — PRASUGREL HYDROCHLORIDE 10 MG: 10 TABLET, FILM COATED ORAL at 10:01

## 2017-01-04 RX ADMIN — Medication 10 ML: at 18:00

## 2017-01-04 RX ADMIN — ATORVASTATIN CALCIUM 80 MG: 40 TABLET, FILM COATED ORAL at 20:25

## 2017-01-04 RX ADMIN — NITROGLYCERIN 1 INCH: 20 OINTMENT TOPICAL at 06:43

## 2017-01-04 RX ADMIN — PERFLUTREN 1 ML: 6.52 INJECTION, SUSPENSION INTRAVENOUS at 11:05

## 2017-01-04 RX ADMIN — LISINOPRIL 5 MG: 5 TABLET ORAL at 10:01

## 2017-01-04 NOTE — PROGRESS NOTES
Bedside and Verbal shift change report given to Umm Travis RN (oncoming nurse). Report included the following information SBAR, Kardex, MAR and Recent Results. R radial site visualized with oncoming RN - site with TR band in place, no hematoma, no bleeding. Integrelin gtt rate verified per MAR. Procedural VS placed in chart.

## 2017-01-04 NOTE — PROGRESS NOTES
Bedside and Verbal shift change report received from Danae Mars RN. Report included the following information SBAR, Kardex, MAR and Recent Results. R radial site visualized, no bleeding, no hematoma, dressing clean/dry/intact.

## 2017-01-04 NOTE — PROGRESS NOTES
Started removing air from TR band upon shift change at 1930. Removed 2 ml of air per protocol. No oozing noted. Patient has a noticeable hematoma proximal to the insertion site on right forearm. Approximately golf ball size and firm. Removed 2 ml at 1945, no oozing, hematoma still exists. Removed 2 ml at 2001, no oozing, hematoma being compressed. TR band has remaining 4 mls of air; removed remaining air with no oozing noted. Compressed hematoma for 15 mins, area became softer immediately. Right radial band removed and allowed to ooze to compress hematoma. Pressure held to site for 20 minutes. Hemostasis obtained, no oozing, no hematoma, +2 radial pulse to right, capillary refill <1 sec to right fingers. Patient states the pain and pressure is much better. Applied 2x2 and tegaderm to site. Educated patient to limit use of right arm and notify RN if any bleeding occurs. Will closely monitor.

## 2017-01-04 NOTE — PROGRESS NOTES
[facility] CARDIOLOGY PROGRESS NOTE           1/4/2017 2:09 PM    Admit Date: 1/3/2017      Subjective:   No cp or sob    ROS:  Cardiovascular:  As noted above    Objective:      Vitals:    01/04/17 0114 01/04/17 0537 01/04/17 0700 01/04/17 1155   BP: 108/59 122/67 115/61 121/67   Pulse: 68 70 67 66   Resp: 18 18 16 16   Temp: 98.1 °F (36.7 °C) 98.1 °F (36.7 °C) 98.2 °F (36.8 °C) 98.5 °F (36.9 °C)   SpO2: 97% 96% 96% 97%   Weight:  126.9 kg (279 lb 11.2 oz)     Height:           Physical Exam:  General-No Acute Distress  Neck- supple, no JVD  CV- regular rate and rhythm no MRG  Lung- clear bilaterally  Abd- soft, nontender, nondistended  Ext- no edema bilaterally.   Skin- warm and dry    Data Review:   Recent Labs      01/04/17   0523  01/04/17   0011  01/03/17   1947  01/03/17   1242   NA  143   --    --   139   K  4.1   --    --   3.5   BUN  20   --    --   22   CREA  1.17   --    --   1.23   GLU  98   --    --   87   WBC  8.7   --    --   11.8*   HGB  12.5*   --    --   13.2*   HCT  39.5*   --    --   40.4*   PLT  314   --    --   389   TROIQ   --   4.40*  5.18*  3.04*   CHOL  215*   --    --    --    TGL  121   --    --    --    LDLC  153.8*   --    --    --    HDL  37*   --    --    --        Assessment/Plan:     Principal Problem:    NSTEMI (non-ST elevated myocardial infarction) ([facility] [address] (1/3/2017)    Active Problems:    Abnormal EKG (1/3/2017)      Hyperlipidemia (1/3/2017)      Obesity (1/3/2017)      Asthma (1/3/2017)    ///  Dec acei to every day, change bb to every day, stop nitrol      Lauryn Tarango MD  1/4/2017 2:09 PM

## 2017-01-05 VITALS
OXYGEN SATURATION: 96 % | SYSTOLIC BLOOD PRESSURE: 104 MMHG | RESPIRATION RATE: 16 BRPM | HEART RATE: 71 BPM | BODY MASS INDEX: 36.82 KG/M2 | TEMPERATURE: 97.9 F | DIASTOLIC BLOOD PRESSURE: 59 MMHG | WEIGHT: 277.8 LBS | HEIGHT: 73 IN

## 2017-01-05 PROCEDURE — 74011250637 HC RX REV CODE- 250/637: Performed by: INTERNAL MEDICINE

## 2017-01-05 PROCEDURE — 74011250637 HC RX REV CODE- 250/637: Performed by: NURSE PRACTITIONER

## 2017-01-05 RX ORDER — LISINOPRIL 5 MG/1
2.5 TABLET ORAL DAILY
Status: DISCONTINUED | OUTPATIENT
Start: 2017-01-05 | End: 2017-01-05 | Stop reason: HOSPADM

## 2017-01-05 RX ORDER — LISINOPRIL 2.5 MG/1
2.5 TABLET ORAL DAILY
Qty: 30 TAB | Refills: 11 | Status: SHIPPED | OUTPATIENT
Start: 2017-01-05 | End: 2018-03-07 | Stop reason: SDUPTHER

## 2017-01-05 RX ORDER — PRASUGREL 10 MG/1
10 TABLET, FILM COATED ORAL DAILY
Qty: 30 TAB | Refills: 11 | Status: SHIPPED | OUTPATIENT
Start: 2017-01-05 | End: 2018-01-05 | Stop reason: SDUPTHER

## 2017-01-05 RX ORDER — GUAIFENESIN 100 MG/5ML
81 LIQUID (ML) ORAL DAILY
Status: SHIPPED | COMMUNITY
Start: 2017-01-05

## 2017-01-05 RX ORDER — METOPROLOL SUCCINATE 25 MG/1
25 TABLET, EXTENDED RELEASE ORAL DAILY
Qty: 30 TAB | Refills: 11 | Status: SHIPPED | OUTPATIENT
Start: 2017-01-05 | End: 2017-09-07 | Stop reason: SDUPTHER

## 2017-01-05 RX ORDER — NITROGLYCERIN 0.4 MG/1
0.4 TABLET SUBLINGUAL
Qty: 1 BOTTLE | Refills: 5 | Status: SHIPPED | OUTPATIENT
Start: 2017-01-05 | End: 2020-05-12 | Stop reason: SDUPTHER

## 2017-01-05 RX ORDER — ATORVASTATIN CALCIUM 80 MG/1
80 TABLET, FILM COATED ORAL
Qty: 30 TAB | Refills: 11 | Status: SHIPPED | OUTPATIENT
Start: 2017-01-05 | End: 2018-03-08 | Stop reason: SDUPTHER

## 2017-01-05 RX ADMIN — LISINOPRIL 2.5 MG: 5 TABLET ORAL at 08:42

## 2017-01-05 RX ADMIN — ASPIRIN 81 MG: 81 TABLET, CHEWABLE ORAL at 08:42

## 2017-01-05 RX ADMIN — PRASUGREL HYDROCHLORIDE 10 MG: 10 TABLET, FILM COATED ORAL at 08:42

## 2017-01-05 RX ADMIN — METOPROLOL SUCCINATE 25 MG: 25 TABLET, EXTENDED RELEASE ORAL at 08:42

## 2017-01-05 NOTE — PROGRESS NOTES
Bedside and verbal report received from [place]. Patient in room. Right radial site accessed.

## 2017-01-05 NOTE — PROGRESS NOTES
Cardiac rehab: chart reviewed, appropriate for outpatient cardiac rehab. Patient qualifies for cardiac rehab with: NSTEMI & PCI. Discussion of benefits of outpatient cardiac rehab provided and time allowed for questions. Education topics included: exercise, diet, and lifestyle changes to improve heart health. Pt is interested in participating at [address]. Pt will be contacted following discharge to schedule orientation appointment.

## 2017-01-05 NOTE — PROGRESS NOTES
Bedside and Verbal shift change report given to Danae Mars RN (oncoming nurse). Report included the following information SBAR, Kardex, MAR and Recent Results.

## 2017-01-05 NOTE — PROGRESS NOTES
Discharge instructions reviewed with patient. Prescriptions given for Toprol, Nitroglycerin, Lipitor, Lisinopril, Effient and med info sheets provided for all new medications. Opportunity for questions provided. Patient voiced understanding of all discharge instructions. Heart monitor discontinued. Patient awaiting transportation home.

## 2017-01-05 NOTE — DISCHARGE INSTRUCTIONS
DISCHARGE SUMMARY from Nurse    The following personal items are in your possession at time of discharge:    Dental Appliances: None  Visual Aid: None     Home Medications: Kept at bedside  Jewelry: Ring  Clothing: At bedside, Footwear, Jacket/Coat, Pants, Shirt, Socks, Undergarments  Other Valuables: Cell Phone, Wallet  Personal Items Sent to Safe: none          PATIENT INSTRUCTIONS:    After general anesthesia or intravenous sedation, for 24 hours or while taking prescription Narcotics:  · Limit your activities  · Do not drive and operate hazardous machinery  · Do not make important personal or business decisions  · Do  not drink alcoholic beverages  · If you have not urinated within 8 hours after discharge, please contact your surgeon on call. Report the following to your surgeon:  · Excessive pain, swelling, redness or odor of or around the surgical area  · Temperature over 100.5  · Nausea and vomiting lasting longer than 4 hours or if unable to take medications  · Any signs of decreased circulation or nerve impairment to extremity: change in color, persistent  numbness, tingling, coldness or increase pain  · Any questions        What to do at Home:  Recommended activity: No lifting, Driving, or Strenuous exercise for 5 days    If you experience any of the following symptoms chest pain, shortness of breath, drainage at puncture sites please follow up with [facility]. *  Please give a list of your current medications to your Primary Care Provider. *  Please update this list whenever your medications are discontinued, doses are      changed, or new medications (including over-the-counter products) are added. *  Please carry medication information at all times in case of emergency situations.           These are general instructions for a healthy lifestyle:    No smoking/ No tobacco products/ Avoid exposure to second hand smoke    Surgeon General's Warning:  Quitting smoking now greatly reduces serious risk to your health. Obesity, smoking, and sedentary lifestyle greatly increases your risk for illness    A healthy diet, regular physical exercise & weight monitoring are important for maintaining a healthy lifestyle    You may be retaining fluid if you have a history of heart failure or if you experience any of the following symptoms:  Weight gain of 3 pounds or more overnight or 5 pounds in a week, increased swelling in our hands or feet or shortness of breath while lying flat in bed. Please call your doctor as soon as you notice any of these symptoms; do not wait until your next office visit. Recognize signs and symptoms of STROKE:    F-face looks uneven    A-arms unable to move or move unevenly    S-speech slurred or non-existent    T-time-call 911 as soon as signs and symptoms begin-DO NOT go       Back to bed or wait to see if you get better-TIME IS BRAIN. Warning Signs of HEART ATTACK     Call 911 if you have these symptoms:   Chest discomfort. Most heart attacks involve discomfort in the center of the chest that lasts more than a few minutes, or that goes away and comes back. It can feel like uncomfortable pressure, squeezing, fullness, or pain.  Discomfort in other areas of the upper body. Symptoms can include pain or discomfort in one or both arms, the back, neck, jaw, or stomach.  Shortness of breath with or without chest discomfort.  Other signs may include breaking out in a cold sweat, nausea, or lightheadedness. Don't wait more than five minutes to call 911 - MINUTES MATTER! Fast action can save your life. Calling 911 is almost always the fastest way to get lifesaving treatment. Emergency Medical Services staff can begin treatment when they arrive -- up to an hour sooner than if someone gets to the hospital by car. The discharge information has been reviewed with the patient. The patient verbalized understanding.     Discharge medications reviewed with the patient and appropriate educational materials and side effects teaching were provided. Percutaneous Coronary Intervention: What to Expect at [facility]    Percutaneous coronary intervention (PCI) is the name for procedures that are used to open a narrowed or blocked coronary artery. The two most common PCI procedures are coronary angioplasty and coronary stent placement. Your groin or arm may have a bruise and feel sore for a day or two after a percutaneous coronary intervention (PCI). You can do light activities around the house, but nothing strenuous for several days. This care sheet gives you a general idea about how long it will take for you to recover. But each person recovers at a different pace. Follow the steps below to get better as quickly as possible. How can you care for yourself at home? Activity  · Do not do strenuous exercise and do not lift, pull, or push anything heavy until your doctor says it is okay. This may be for a day or two. You can walk around the house and do light activity, such as cooking. · You may shower 24 to 48 hours after the procedure, if your doctor okays it. Pat the incision dry. Do not take a bath for 1 week, or until your doctor tells you it is okay. · If the catheter was placed in your groin, try not to walk up stairs for the first couple of days. · If the catheter was placed in your arm near your wrist, do not bend your wrist deeply for the first couple of days. Be careful using your hand to get into and out of a chair or bed. · If your doctor recommends it, get more exercise. Walking is a good choice. Bit by bit, increase the amount you walk every day. Try for at least 30 minutes on most days of the week. Diet  · Drink plenty of fluids to help your body flush out the dye. If you have kidney, heart, or liver disease and have to limit fluids, talk with your doctor before you increase the amount of fluids you drink.   · Keep eating a heart-healthy diet that has lots of fruits, vegetables, and whole grains. If you have not been eating this way, talk to your doctor. You also may want to talk to a dietitian. This expert can help you to learn about healthy foods and plan meals. Medicines  · Your doctor will tell you if and when you can restart your medicines. He or she will also give you instructions about taking any new medicines. · If you take blood thinners, such as warfarin (Coumadin), clopidogrel (Plavix), or aspirin, be sure to talk to your doctor. He or she will tell you if and when to start taking those medicines again. Make sure that you understand exactly what your doctor wants you to do. · Your doctor will prescribe blood-thinning medicines. You will likely take aspirin plus another antiplatelet, such as clopidogrel (Plavix). It is very important that you take these medicines exactly as directed. These medicines help keep the coronary artery open and reduce your risk of a heart attack. · Call your doctor if you think you are having a problem with your medicine. Care of the catheter site  · For 1 or 2 days, keep a bandage over the spot where the catheter was inserted. The bandage probably will fall off in this time. · Put ice or a cold pack on the area for 10 to 20 minutes at a time to help with soreness or swelling. Put a thin cloth between the ice and your skin. Follow-up care is a key part of your treatment and safety. Be sure to make and go to all appointments, and call your doctor if you are having problems. It's also a good idea to know your test results and keep a list of the medicines you take. When should you call for help? Call 911 anytime you think you may need emergency care. For example, call if:  · You passed out (lost consciousness). · You have severe trouble breathing. · You have sudden chest pain and shortness of breath, or you cough up blood.   · You have symptoms of a heart attack, such as:  ¨ Chest pain or pressure. ¨ Sweating. ¨ Shortness of breath. ¨ Nausea or vomiting. ¨ Pain that spreads from the chest to the neck, jaw, or one or both shoulders or arms. ¨ Dizziness or lightheadedness. ¨ A fast or uneven pulse. After calling 911, chew 1 adult-strength aspirin. Wait for an ambulance. Do not try to drive yourself. · You have been diagnosed with angina, and you have angina symptoms that do not go away with rest or are not getting better within 5 minutes after you take one dose of nitroglycerin. Call your doctor now or seek immediate medical care if:  · You are bleeding from the area where the catheter was put in your artery. · You have a fast-growing, painful lump at the catheter site. · You have signs of infection, such as:  ¨ Increased pain, swelling, warmth, or redness. ¨ Red streaks leading from the catheter site. ¨ Pus draining from the catheter site. ¨ A fever. · Your leg or arm looks blue or feels cold, numb, or tingly. Watch closely for changes in your health, and be sure to contact your doctor if you have any problems. Where can you learn more? Go to http://tim-salome.info/. Enter S426 in the search box to learn more about \"Percutaneous Coronary Intervention: What to Expect at Home. \"  Current as of: January 27, 2016  Content Version: 11.1  © 4986-6174 [employer]. Care instructions adapted under license by [employer] (which disclaims liability or warranty for this information). If you have questions about a medical condition or this instruction, always ask your healthcare professional. [place] [address] any warranty or liability for your use of this information. Heart Attack: Care Instructions  Your Care Instructions    A heart attack (myocardial infarction, or MI) occurs when one or more of the coronary arteries, which supply the heart with oxygen-rich blood, is blocked.  A blockage usually occurs when plaque inside the artery breaks open and a blood clot forms in the artery. After a heart attack, you may be worried about your future. Over the next several weeks, your heart will start to heal. Though it can be hard to break old habits, you can prevent another heart attack by making some lifestyle changes and by taking medicines. You may use this information for ideas about what to do at home to speed your recovery. Follow-up care is a key part of your treatment and safety. Be sure to make and go to all appointments, and call your doctor if you are having problems. It's also a good idea to know your test results and keep a list of the medicines you take. How can you care for yourself at home? Activity  · Until your doctor says it is okay, do not do strenuous exercise. And do not lift, pull, or push anything heavy. Ask your doctor what types of activities are safe for you. · If your doctor has not set you up with a cardiac rehabilitation (rehab) program, talk to him or her about whether that is right for you. Cardiac rehab includes supervised exercise. It also includes help with diet and lifestyle changes and emotional support. It may reduce your risk of future heart problems. · Increase your activities slowly. Take short rest breaks when you get tired. · If your doctor recommends it, get more exercise. Walking is a good choice. Bit by bit, increase the amount you walk every day. Try for at least 30 minutes on most days of the week. You also may want to swim, bike, or do other activities. Talk with your doctor before you start an exercise program to make sure it is safe for you. · Ask your doctor when you can drive, go back to work, and do other daily activities again. · You can have sex as soon as you feel ready for it. Often this means when you can easily walk around or climb stairs. Talk with your doctor if you have any concerns.  If you are taking nitroglycerin, do not take erection-enhancing medicine such as sildenafil (Viagra), tadalafil (Cialis), or vardenafil (Levitra) . Lifestyle changes  · Do not smoke. Smoking increases your risk of another heart attack. If you need help quitting, talk to your doctor about stop-smoking programs and medicines. These can increase your chances of quitting for good. · Eat a heart-healthy diet that is low in saturated fat and salt, and is full of fruits, vegetables and whole-grains. Eat at least two servings of fish each week. You may get more details about how to eat healthy. But these tips can help you get started. · Stay at a healthy weight, or lose weight if you need to. Medicines  · Be safe with medicines. Take your medicines exactly as prescribed. Call your doctor if you think you are having a problem with your medicine. You will get more details on the specific medicines your doctor prescribes. Do not stop taking your medicine unless your doctor tells you to. Not taking your medicine might raise your risk of having another heart attack. · You may need several medicines to help lower your risk of another heart attack. These include:  ¨ Blood pressure medicines such as angiotensin-converting enzyme (ACE) inhibitors, ARBs (angiotensin II receptor blockers), and beta-blockers. ¨ Cholesterol medicine called statins. ¨ Aspirin and other blood thinners. These prevent blood clots that can cause a heart attack. · If your doctor has given you nitroglycerin, keep it with you at all times. If you have angina symptoms, such as chest pain or pressure, sit down and rest. Take the first dose of nitroglycerin as directed. If symptoms get worse or are not getting better within 5 minutes, call 911 right away. Stay on the phone. The emergency  will tell you what to do. · Do not take any over-the-counter medicines, vitamins, or herbal products without talking to your doctor first.  Staying healthy  · Manage other health conditions such as high blood pressure and diabetes.   · Avoid colds and flu. Get a pneumococcal vaccine shot. If you have had one before, ask your doctor whether you need another dose. Get the flu vaccine every year. If you must be around people with colds or flu, wash your hands often. · Be sure to tell your doctor about any angina symptoms you have had, even if they went away. Pay attention to your angina symptoms. Know what is typical for you and learn how to control it. Know when to call for help. · Talk to your family, friends, or a counselor about your feelings. It is normal to feel frightened, angry, hopeless, helpless, and even guilty. Talking openly about bad feelings can help you cope. If you have symptoms of depression, talk to your doctor. When should you call for help? Call 911 anytime you think you may need emergency care. For example, call if:  · You have symptoms of a heart attack. These may include:  ¨ Chest pain or pressure, or a strange feeling in the chest.  ¨ Sweating. ¨ Shortness of breath. ¨ Nausea or vomiting. ¨ Pain, pressure, or a strange feeling in the back, neck, jaw, or upper belly or in one or both shoulders or arms. ¨ Lightheadedness or sudden weakness. ¨ A fast or irregular heartbeat. After you call 911, the  may tell you to chew 1 adult-strength or 2 to 4 low-dose aspirin. Wait for an ambulance. Do not try to drive yourself. · You have angina symptoms (such as chest pain or pressure) that do not go away with rest or are not getting better within 5 minutes after you take a dose of nitroglycerin. · You passed out (lost consciousness). · You feel like you are having another heart attack. Call your doctor now or seek immediate medical care if:  · You are having angina symptoms, such as chest pain or pressure, more often than usual, or the symptoms are different or worse than usual.  · You have new or increased shortness of breath. · You are dizzy or lightheaded, or you feel like you may faint.   Watch closely for changes in your health, and be sure to contact your doctor if you have any problems. Where can you learn more? Go to http://tim-salome.info/. Enter [id number] in the search box to learn more about \"Heart Attack: Care Instructions. \"  Current as of: January 27, 2016  Content Version: 11.1  © 2006-2016 [employer]. Care instructions adapted under license by [employer] (which disclaims liability or warranty for this information). If you have questions about a medical condition or this instruction, always ask your healthcare professional. [address] any warranty or liability for your use of this information. Reducing Heart Attack Risk With Daily Medicine: Care Instructions  Your Care Instructions    Heart disease is the number one cause of death. If you are at risk for heart disease, there are many medicines that can reduce your risk. These include:  · ACE inhibitors. These are a type of blood pressure medicine. They can reduce the risk of heart attacks and strokes if you are at high risk. · Statin medicines. These lower cholesterol. They can also reduce the risk of heart disease and strokes. · Aspirin. It can help certain people lower their risk of a heart attack or stroke. · Beta-blocker medicines. These are a type of blood pressure and heart medicine. They can reduce the chance of early death if you have had a heart attack. All medicines can cause side effects. So it is important to understand the pros and cons of any medicine you take. It is also important to take your medicines exactly as your doctor tells you to. Follow-up care is a key part of your treatment and safety. Be sure to make and go to all appointments, and call your doctor if you are having problems. It's also a good idea to know your test results and keep a list of the medicines you take. ACE inhibitors  ACE (angiotensin-converting enzyme) inhibitors are used for three main reasons.  They lower blood pressure, protect the kidneys, and prevent heart attacks and strokes. Examples include benazepril (Lotensin), lisinopril (Prinivil, Zestril), and ramipril (Altace). Before you start taking an ACE inhibitor, make sure your doctor knows if:  · You are taking a water pill (diuretic). · You are taking potassium pills or using salt substitutes. · You are pregnant or breastfeeding. · You have had a kidney transplant or other kidney problems. ACE inhibitors can cause side effects. Call your doctor right away if you have:  · Trouble breathing. · Swelling in your face, head, neck, or tongue. · Dizziness or lightheadedness. · A dry cough. Statins  Statins lower cholesterol. Examples include atorvastatin (Lipitor), lovastatin (Mevacor), pravastatin (Pravachol), and simvastatin (Zocor). Before you start taking a statin, make sure your doctor knows if:  · You have had a kidney transplant or other kidney problems. · You have liver disease. · You take any other prescription medicine, over-the-counter medicine, vitamins, supplements, or herbal remedies. · You are pregnant or breastfeeding. Statins can cause side effects. Call your doctor right away if you have:  · New, severe muscle aches. · Brown urine. Aspirin  Taking an aspirin every day can lower your risk for a heart attack. A heart attack occurs when a blood vessel in the heart gets blocked. When this happens, oxygen can't get to the heart muscle, and part of the heart dies. Aspirin can help prevent blood clots that can block the blood vessels. Talk to your doctor before you start taking aspirin every day. He or she may recommend that you take one low-dose aspirin (81 mg) tablet each day, with a meal and a full glass of water. Taking aspirin isn't right for everyone, because it can cause serious bleeding. And you may not be able to use aspirin if you:  · Have asthma. · Have an ulcer or other stomach problem.   · Take some other medicine (called a blood thinner) that prevents blood clots. · Are allergic to aspirin. Before having a surgery or procedure, tell your doctor or dentist that you take aspirin. He or she will tell you if you should stop taking aspirin beforehand. Make sure that you understand exactly what your doctor wants you to do. Aspirin can cause side effects. Call your doctor right away if you have:  · Unusual bleeding or bruising. · Nausea, vomiting, or heartburn. · Black or bloody stools. Beta-blockers  Beta-blockers are used for three main reasons. They lower blood pressure, relieve angina symptoms (such as chest pain or pressure), and reduce the chances of a second heart attack. They include atenolol (Tenormin), carvedilol (Coreg), and metoprolol (Lopressor). Before you start taking a beta-blocker, make sure your doctor knows if you have:  · Severe asthma or frequent asthma attacks. · A very slow pulse (less than 55 beats a minute). Beta-blockers can cause side effects. Call your doctor right away if you have:  · Wheezing or trouble breathing. · Dizziness or lightheadedness. · Asthma that gets worse. When should you call for help? Call 911 anytime you think you may need emergency care. For example, call if:  · You passed out (lost consciousness). Call your doctor now or seek immediate medical care if:  · You are wheezing or have trouble breathing. · You have swelling in your face, head, neck, or tongue. · You are dizzy or lightheaded, or you feel like you may faint. · You have severe muscle pain, weakness, or brown urine. · You have vision problems. · You have new bruises or blood spots under your skin. · Your stools are black and tarlike or have streaks of blood. Watch closely for changes in your health, and be sure to contact your doctor if:  · You have ringing in your ears. · You feel very tired. · You have gas, constipation, or an upset stomach. Where can you learn more?   Go to http://tim-salome.info/. Enter R428 in the search box to learn more about \"Reducing Heart Attack Risk With Daily Medicine: Care Instructions. \"  Current as of: March 28, 2016  Content Version: 11.1  © 1713-3036 [employer]. Care instructions adapted under license by [email or web address] (which disclaims liability or warranty for this information). If you have questions about a medical condition or this instruction, always ask your healthcare professional. [address] any warranty or liability for your use of this information.

## 2017-01-05 NOTE — DISCHARGE SUMMARY
[facility] Cardiology Discharge Summary     Patient ID:  Suzie Abrams  [id number]  42 y.o.  1980    Admit date: 1/3/2017    Discharge date:  01/05/2017    Admitting Physician: Adrien Culver MD     Discharge Physician: Sachin Bailey NP/Dr. Taylor    Admission Diagnoses: NSTEMI (non-ST elevated myocardial infarction) [facility])  NSTEMI (non-ST elevated myocardial infarction) [facility])    Discharge Diagnoses:    Diagnosis    NSTEMI (non-ST elevated myocardial infarction) ([facility] [address]    Abnormal EKG    Hyperlipidemia    Obesity    Asthma       Cardiology Procedures this admission:  Left heart catheterization with PCI  EchoCardiogram  Consults: None    Hospital Course: Patient presented to the ER with c/o left sided chest pressure with radiation down left arm after playing basketball. He woke in the AM and noticed palpitations and chest soreness. In the ED his EKG showed T wave inversion in anterolateral leads, with troponin levels peaking at 5. 18. Patient underwent cardiac catheterization by Dr. Julita Beth. Patient was found to have total occlusion of PDA, which was aspirated x 2 with removal of large, long white clot and then stenting of 60% PDA with Xience BERNARDA 2.5x18  with 10% residual stenosis. Patient was also found to have 80% stenosis of the prox RCA that was stented with a Xience BERNARDA 4.0x38 with 10% residual stenosis. Patient tolerated the procedure well and was taken to the telemetry floor for recovery. The morning of discharge, patient was up feeling well without any complaints of chest pain or shortness of breath. Patient's right radial cath site was clean, dry and intact without hematoma or bruit, but with some area of redness above site. Patient's labs were WNL. Patient was seen and examined by Dr. Naima Bergman and determined stable and ready for discharge.  Patient was instructed on the importance of medication compliance including taking aspirin and Effient everyday without missing a dose.  After receiving drug eluting stents, the patient will remain on dual anti-platelet therapy for 1 year. For maximized medical therapy for CAD, patient will continue BB, ACE-I, and statin as well. The patient will follow up with [facility]iology -- Dr. Todd Waters on 1/12/17 @ 315 in the [place] office . Patient has been referred to cardiac rehab. Echo results:     Left ventricle: The ventricle was mildly dilated. Systolic function was at  the lower limits of normal. Ejection fraction was estimated in the range of   50 % to 55 %. There were no regional wall motion abnormalities. -  Tricuspid valve: There was mild regurgitation    DISPOSITION: The patient is being discharged home in stable condition on a low saturated fat, low cholesterol and low salt diet. The patient is instructed to advance activities as tolerated to the limit of fatigue or shortness of breath. The patient is instructed to avoid all heavy lifting, straining, stooping or squatting for 3-5 days. The patient is instructed to watch the cath site for bleeding/oozing; if seen, the patient is instructed to apply firm pressure with a clean cloth and call [facility]iology at [phone]. The patient is instructed to watch for signs of infection which include: increasing area of redness, fever/hot to touch or purulent drainage at the catheterization site. The patient is instructed not to soak in a bathtub for 7-10 days, but is cleared to shower. The patient is instructed to call the office or return to the ER for immediate evaluation for any shortness of breath or chest pain not relieved by NTG. Discharge Exam:   Visit Vitals    /59 (BP 1 Location: Left arm, BP Patient Position: At rest)    Pulse 71    Temp 97.9 °F (36.6 °C)    Resp 16    Ht 6' 1\" (1.854 m)    Wt 126 kg (277 lb 12.8 oz)    SpO2 96%    BMI 36.65 kg/m2     Patient has been seen by Dr. Christa Mark: see his progress note for exam details.     No results found for this or any previous visit (from the past 24 hour(s)). Patient Instructions:   Current Discharge Medication List      START taking these medications    Details   aspirin 81 mg chewable tablet Take 1 Tab by mouth daily. atorvastatin (LIPITOR) 80 mg tablet Take 1 Tab by mouth nightly. Qty: 30 Tab, Refills: 11      lisinopril (PRINIVIL, ZESTRIL) 2.5 mg tablet Take 1 Tab by mouth daily. Qty: 30 Tab, Refills: 11      metoprolol succinate (TOPROL-XL) 25 mg XL tablet Take 1 Tab by mouth daily. Qty: 30 Tab, Refills: 11      nitroglycerin (NITROSTAT) 0.4 mg SL tablet 1 Tab by SubLINGual route every five (5) minutes as needed for Chest Pain. Qty: 1 Bottle, Refills: 5      prasugrel (EFFIENT) 10 mg tablet Take 1 Tab by mouth daily. Qty: 30 Tab, Refills: 11         CONTINUE these medications which have NOT CHANGED    Details   albuterol (PROAIR HFA) 90 mcg/actuation inhaler Take 2 Puffs by inhalation every six (6) hours as needed for Wheezing.  Indications: Acute Asthma Attack               Signed:  Atiya Walter NP  1/5/2017  8:06 AM

## 2017-01-17 PROBLEM — I25.10 CORONARY ARTERY DISEASE INVOLVING NATIVE HEART: Status: ACTIVE | Noted: 2017-01-17

## 2017-01-26 ENCOUNTER — HOSPITAL ENCOUNTER (OUTPATIENT)
Dept: CARDIAC REHAB | Age: 37
Discharge: HOME | End: 2017-01-26

## 2017-01-26 NOTE — CARDIO/PULMONARY
Dear Dr. Precious Julien:    Thank you for referring your patient, Marisa Barba  (1980), to the Cardiac Rehabilitation Program at [facility]. Mr. Lynda Curiel is a good candidate for the Rehab Program and should see improvements with regular participation. We will be addressing appropriate interventions for modifiable risk factors with your patient during the next 12 weeks. We will contact you with any issues or concerns that may arise, or you can follow your patients progress through [address] [place] [address] at any time. A final summary will be sent to you when the program is completed. Again, thank you for your referral. If we can be of further assistance, please feel free to contact the Cardiopulmonary Rehab staff at [phone].

## 2017-01-27 LAB
ACT BLD: 188 SECS (ref 79–138)
ACT BLD: 219 SECS (ref 79–138)
ACT BLD: 250 SECS (ref 79–138)
ACT BLD: 322 SECS (ref 79–138)

## 2017-01-31 ENCOUNTER — HOSPITAL ENCOUNTER (OUTPATIENT)
Dept: CARDIAC REHAB | Age: 37
Discharge: HOME | End: 2017-01-31
Payer: COMMERCIAL

## 2017-01-31 VITALS — BODY MASS INDEX: 36.23 KG/M2 | WEIGHT: 273.4 LBS | HEIGHT: 73 IN

## 2017-01-31 PROCEDURE — 93798 PHYS/QHP OP CAR RHAB W/ECG: CPT

## 2017-02-01 ENCOUNTER — HOSPITAL ENCOUNTER (OUTPATIENT)
Dept: CARDIAC REHAB | Age: 37
Discharge: HOME | End: 2017-02-01
Attending: INTERNAL MEDICINE
Payer: COMMERCIAL

## 2017-02-01 ENCOUNTER — APPOINTMENT (OUTPATIENT)
Dept: CARDIAC REHAB | Age: 37
End: 2017-02-01
Attending: INTERNAL MEDICINE
Payer: COMMERCIAL

## 2017-02-01 PROCEDURE — 93798 PHYS/QHP OP CAR RHAB W/ECG: CPT

## 2017-02-06 ENCOUNTER — HOSPITAL ENCOUNTER (OUTPATIENT)
Dept: CARDIAC REHAB | Age: 37
Discharge: HOME | End: 2017-02-06
Attending: INTERNAL MEDICINE
Payer: COMMERCIAL

## 2017-02-06 ENCOUNTER — APPOINTMENT (OUTPATIENT)
Dept: CARDIAC REHAB | Age: 37
End: 2017-02-06
Attending: INTERNAL MEDICINE
Payer: COMMERCIAL

## 2017-02-06 PROCEDURE — 93798 PHYS/QHP OP CAR RHAB W/ECG: CPT

## 2017-02-08 ENCOUNTER — HOSPITAL ENCOUNTER (OUTPATIENT)
Dept: CARDIAC REHAB | Age: 37
Discharge: HOME | End: 2017-02-08
Attending: INTERNAL MEDICINE
Payer: COMMERCIAL

## 2017-02-08 ENCOUNTER — APPOINTMENT (OUTPATIENT)
Dept: CARDIAC REHAB | Age: 37
End: 2017-02-08
Attending: INTERNAL MEDICINE
Payer: COMMERCIAL

## 2017-02-08 VITALS — WEIGHT: 271.4 LBS | BODY MASS INDEX: 35.81 KG/M2

## 2017-02-08 PROCEDURE — 93798 PHYS/QHP OP CAR RHAB W/ECG: CPT

## 2017-02-13 ENCOUNTER — HOSPITAL ENCOUNTER (OUTPATIENT)
Dept: CARDIAC REHAB | Age: 37
Discharge: HOME | End: 2017-02-13
Attending: INTERNAL MEDICINE
Payer: COMMERCIAL

## 2017-02-13 ENCOUNTER — APPOINTMENT (OUTPATIENT)
Dept: CARDIAC REHAB | Age: 37
End: 2017-02-13
Attending: INTERNAL MEDICINE
Payer: COMMERCIAL

## 2017-02-13 PROCEDURE — 93798 PHYS/QHP OP CAR RHAB W/ECG: CPT

## 2017-02-15 ENCOUNTER — HOSPITAL ENCOUNTER (OUTPATIENT)
Dept: CARDIAC REHAB | Age: 37
Discharge: HOME | End: 2017-02-15
Attending: INTERNAL MEDICINE
Payer: COMMERCIAL

## 2017-02-15 ENCOUNTER — APPOINTMENT (OUTPATIENT)
Dept: CARDIAC REHAB | Age: 37
End: 2017-02-15
Attending: INTERNAL MEDICINE
Payer: COMMERCIAL

## 2017-02-15 VITALS — BODY MASS INDEX: 35.67 KG/M2 | WEIGHT: 270.4 LBS

## 2017-02-15 PROCEDURE — 93798 PHYS/QHP OP CAR RHAB W/ECG: CPT

## 2017-02-20 ENCOUNTER — APPOINTMENT (OUTPATIENT)
Dept: CARDIAC REHAB | Age: 37
End: 2017-02-20
Attending: INTERNAL MEDICINE
Payer: COMMERCIAL

## 2017-02-20 ENCOUNTER — HOSPITAL ENCOUNTER (OUTPATIENT)
Dept: CARDIAC REHAB | Age: 37
Discharge: HOME | End: 2017-02-20
Attending: INTERNAL MEDICINE
Payer: COMMERCIAL

## 2017-02-20 PROCEDURE — 93798 PHYS/QHP OP CAR RHAB W/ECG: CPT

## 2017-02-22 ENCOUNTER — HOSPITAL ENCOUNTER (OUTPATIENT)
Dept: CARDIAC REHAB | Age: 37
Discharge: HOME | End: 2017-02-22
Attending: INTERNAL MEDICINE
Payer: COMMERCIAL

## 2017-02-22 ENCOUNTER — APPOINTMENT (OUTPATIENT)
Dept: CARDIAC REHAB | Age: 37
End: 2017-02-22
Attending: INTERNAL MEDICINE
Payer: COMMERCIAL

## 2017-02-22 VITALS — WEIGHT: 268.2 LBS | BODY MASS INDEX: 35.38 KG/M2

## 2017-02-22 PROCEDURE — 93798 PHYS/QHP OP CAR RHAB W/ECG: CPT

## 2017-02-27 ENCOUNTER — APPOINTMENT (OUTPATIENT)
Dept: CARDIAC REHAB | Age: 37
End: 2017-02-27
Attending: INTERNAL MEDICINE
Payer: COMMERCIAL

## 2017-03-01 ENCOUNTER — HOSPITAL ENCOUNTER (OUTPATIENT)
Dept: CARDIAC REHAB | Age: 37
End: 2017-03-01
Attending: INTERNAL MEDICINE

## 2017-03-01 ENCOUNTER — APPOINTMENT (OUTPATIENT)
Dept: CARDIAC REHAB | Age: 37
End: 2017-03-01
Attending: INTERNAL MEDICINE

## 2017-03-02 NOTE — PROGRESS NOTES
Client History:  Current Medical Diagnosis: see ITP  Pertinent Medications: see review PTA meds      Have you gained or lost any weight in the past 3 months: lost 18 lbs     What do you attribute it to: dietary changes  What is your weight goal: see cardiac ITP    Have you made any changes in your eating habits since your heart problems began: yes-increased fruit and vegetable intake, lowered red meat intake, eating more fish, watching sodium and portion sizes    Describe your appetite: good    Supplements/Vitamins/Herbs: no    Food allergies: no    Problems with digestion, N/V/C/D: no    Alcohol use: 2 beers every 3 months    Dietary recall:  Breakfast is a smoothie of apple juice and frozen fruit. Snack is fruit or [place] yogurt. Lunch is salad with chicken or turkey sandwich on whole wheat bread and lite fernandez. Snack is fruit or [place] yogurt. Chidi Later is fish or chicken on a salad. No salt use. Pt drinks 80 oz water per day. Anthropometric Data: see cardiac ITP  Ht:   Wt:   Age:  BMI:  Waist measurement:     Estimated Nutritional Needs:  Calories: 15 kcal/kg XSC=3881 kcals  Protein: 1.2-1.5gm/kg GMP=130-577 gm  CHO: n/a  Fluids:1ml/kcal      Nutrition Diagnosis:  Good knowledge base related to therapeutic diet as evidenced by diet low in saturated fat and sodium and  high in fiber. Nutrition Intervention:  Reviewed lab/body comp results/goals. Reviewed rate your plate and heart healthy choices. Affirmed that pt is not adding salt and has made so many changes. Reviewed label reading. Reviewed sodium and fiber guidelines. Reviewed recipe modification tips. Handouts: lab/body comp, heart healthy, grocery shopping guide, fiber, herbs and spices, recipes, recipe modification tips,    Goals: See cardiac ITP         Monitoring/Evaluation: Provided RD name and phone number for f/u questions. Pt has completed cardiac rehab.     Israel Renee, KODAK, LD, CDE  Phone: [phone]

## 2017-03-06 ENCOUNTER — APPOINTMENT (OUTPATIENT)
Dept: CARDIAC REHAB | Age: 37
End: 2017-03-06
Attending: INTERNAL MEDICINE

## 2017-04-13 ENCOUNTER — HOSPITAL ENCOUNTER (OUTPATIENT)
Dept: LAB | Age: 37
Discharge: HOME | End: 2017-04-13
Attending: INTERNAL MEDICINE
Payer: COMMERCIAL

## 2017-04-13 DIAGNOSIS — I25.10 CORONARY ARTERY DISEASE WITHOUT ANGINA PECTORIS, UNSPECIFIED VESSEL OR LESION TYPE, UNSPECIFIED WHETHER NATIVE OR TRANSPLANTED HEART: ICD-10-CM

## 2017-04-13 LAB
ALBUMIN SERPL BCP-MCNC: 4 G/DL (ref 3.5–5)
ALBUMIN/GLOB SERPL: 1.2 {RATIO}
ALP SERPL-CCNC: 119 U/L (ref 50–136)
ALT SERPL-CCNC: 35 U/L (ref 12–65)
AST SERPL W P-5'-P-CCNC: 25 U/L (ref 15–37)
BILIRUB DIRECT SERPL-MCNC: 0.2 MG/DL
BILIRUB SERPL-MCNC: 0.8 MG/DL (ref 0.2–1.1)
GLOBULIN SER CALC-MCNC: 3.4 G/DL
PROT SERPL-MCNC: 7.4 G/DL (ref 6.3–8.2)

## 2017-04-13 PROCEDURE — 36415 COLL VENOUS BLD VENIPUNCTURE: CPT | Performed by: INTERNAL MEDICINE

## 2017-04-13 PROCEDURE — 80076 HEPATIC FUNCTION PANEL: CPT | Performed by: INTERNAL MEDICINE

## 2019-04-11 ENCOUNTER — HOSPITAL ENCOUNTER (OUTPATIENT)
Dept: LAB | Age: 39
Discharge: HOME | End: 2019-04-11
Payer: COMMERCIAL

## 2019-04-11 DIAGNOSIS — I25.10 CORONARY ARTERY DISEASE INVOLVING NATIVE HEART, ANGINA PRESENCE UNSPECIFIED, UNSPECIFIED VESSEL OR LESION TYPE: ICD-10-CM

## 2019-04-11 LAB
CHOLEST SERPL-MCNC: 167 MG/DL
EST. AVERAGE GLUCOSE BLD GHB EST-MCNC: 140 MG/DL
HBA1C MFR BLD: 6.5 % (ref 4.8–6)
HDLC SERPL-MCNC: 58 MG/DL (ref 40–60)
HDLC SERPL: 2.9 {RATIO}
LDLC SERPL CALC-MCNC: 98.8 MG/DL
LIPID PROFILE,FLP: NORMAL
TRIGL SERPL-MCNC: 51 MG/DL (ref 35–150)
VLDLC SERPL CALC-MCNC: 10.2 MG/DL (ref 6–23)

## 2019-04-11 PROCEDURE — 36415 COLL VENOUS BLD VENIPUNCTURE: CPT

## 2019-04-11 PROCEDURE — 83036 HEMOGLOBIN GLYCOSYLATED A1C: CPT

## 2019-04-11 PROCEDURE — 80061 LIPID PANEL: CPT

## 2022-03-18 PROBLEM — R94.31 ABNORMAL EKG: Status: ACTIVE | Noted: 2017-01-03

## 2022-03-19 PROBLEM — I25.10 CORONARY ARTERY DISEASE INVOLVING NATIVE HEART: Status: ACTIVE | Noted: 2017-01-17

## 2022-03-19 PROBLEM — E78.5 HYPERLIPIDEMIA: Status: ACTIVE | Noted: 2017-01-03

## 2022-03-19 PROBLEM — E66.9 OBESITY: Status: ACTIVE | Noted: 2017-01-03

## 2022-03-20 PROBLEM — I21.4 NSTEMI (NON-ST ELEVATED MYOCARDIAL INFARCTION) (HCC): Status: ACTIVE | Noted: 2017-01-03

## 2022-03-20 PROBLEM — J45.909 ASTHMA: Status: ACTIVE | Noted: 2017-01-03

## 2022-07-06 ENCOUNTER — CLINICAL DOCUMENTATION (OUTPATIENT)
Dept: CARDIOLOGY CLINIC | Age: 42
End: 2022-07-06

## 2022-07-06 NOTE — PROGRESS NOTES
Received paper PA for Praluent. Form completed and faxed to [facility] [phone] on 07/06/2022.

## 2022-07-29 NOTE — TELEPHONE ENCOUNTER
MEDICATION REFILL REQUEST      Name of Medication:  Atorvastatin 10mg , Metoprolol 25 mg and Praluent 75 mg   Dose:    Frequency:    Quantity:    Days' supply:  90      Pharmacy Name/Location:  [facility] in [place] and CALL the Praluent in to the [facility] in [place]

## 2022-07-30 RX ORDER — NITROGLYCERIN 0.4 MG/1
0.4 TABLET SUBLINGUAL EVERY 5 MIN PRN
Qty: 25 TABLET | Refills: 1 | Status: SHIPPED | OUTPATIENT
Start: 2022-07-30

## 2022-07-30 RX ORDER — ALIROCUMAB 75 MG/ML
75 INJECTION, SOLUTION SUBCUTANEOUS
Qty: 2 PEN | Refills: 11 | Status: SHIPPED | OUTPATIENT
Start: 2022-07-30

## 2022-07-30 RX ORDER — METOPROLOL SUCCINATE 25 MG/1
25 TABLET, EXTENDED RELEASE ORAL DAILY
Qty: 90 TABLET | Refills: 3 | Status: SHIPPED | OUTPATIENT
Start: 2022-07-30

## 2022-07-30 RX ORDER — ATORVASTATIN CALCIUM 10 MG/1
10 TABLET, FILM COATED ORAL DAILY
Qty: 90 TABLET | Refills: 3 | Status: SHIPPED | OUTPATIENT
Start: 2022-07-30

## 2023-05-11 ENCOUNTER — OFFICE VISIT (OUTPATIENT)
Age: 43
End: 2023-05-11
Payer: COMMERCIAL

## 2023-05-11 VITALS
SYSTOLIC BLOOD PRESSURE: 134 MMHG | WEIGHT: 271 LBS | HEIGHT: 73 IN | BODY MASS INDEX: 35.92 KG/M2 | HEART RATE: 44 BPM | DIASTOLIC BLOOD PRESSURE: 82 MMHG

## 2023-05-11 DIAGNOSIS — I10 ESSENTIAL (PRIMARY) HYPERTENSION: ICD-10-CM

## 2023-05-11 DIAGNOSIS — E78.5 HYPERLIPIDEMIA, UNSPECIFIED HYPERLIPIDEMIA TYPE: Primary | ICD-10-CM

## 2023-05-11 DIAGNOSIS — I25.10 CORONARY ARTERY DISEASE INVOLVING NATIVE HEART WITHOUT ANGINA PECTORIS, UNSPECIFIED VESSEL OR LESION TYPE: ICD-10-CM

## 2023-05-11 DIAGNOSIS — R00.1 BRADYCARDIA, UNSPECIFIED: ICD-10-CM

## 2023-05-11 PROCEDURE — 3075F SYST BP GE 130 - 139MM HG: CPT | Performed by: INTERNAL MEDICINE

## 2023-05-11 PROCEDURE — 93000 ELECTROCARDIOGRAM COMPLETE: CPT | Performed by: INTERNAL MEDICINE

## 2023-05-11 PROCEDURE — 99214 OFFICE O/P EST MOD 30 MIN: CPT | Performed by: INTERNAL MEDICINE

## 2023-05-11 PROCEDURE — 3079F DIAST BP 80-89 MM HG: CPT | Performed by: INTERNAL MEDICINE

## 2023-05-11 ASSESSMENT — ENCOUNTER SYMPTOMS
COUGH: 0
NAIL CHANGES: 0
EYE PAIN: 0
ABDOMINAL PAIN: 0
STRIDOR: 0
APHONIA: 0

## 2023-05-11 NOTE — PROGRESS NOTES
Never    Smokeless tobacco: Never   Substance Use Topics    Alcohol use: No       ROS:    Review of Systems   Constitutional: Negative for fever. HENT:  Negative for stridor. Eyes:  Negative for pain. Cardiovascular:  Negative for chest pain. Respiratory:  Negative for cough. Endocrine: Negative for cold intolerance. Skin:  Negative for nail changes. Musculoskeletal:  Negative for arthritis. Gastrointestinal:  Negative for abdominal pain. Genitourinary:  Negative for dysuria. Neurological:  Negative for aphonia. Psychiatric/Behavioral:  Negative for altered mental status. Allergic/Immunologic: Negative for hives. PHYSICAL EXAM:    /82   Pulse (!) 44   Ht 6' 1\" (1.854 m)   Wt 271 lb (122.9 kg)   BMI 35.75 kg/m²        Wt Readings from Last 3 Encounters:   05/11/23 271 lb (122.9 kg)   05/20/22 288 lb 1.6 oz (130.7 kg)   05/14/21 276 lb (125.2 kg)     BP Readings from Last 3 Encounters:   05/11/23 134/82   05/20/22 118/78   05/14/21 (!) 136/96         Physical Exam  Vitals reviewed. HENT:      Head: Normocephalic. Right Ear: External ear normal.      Left Ear: External ear normal.      Nose: Nose normal.   Eyes:      General: No scleral icterus. Pulmonary:      Effort: Pulmonary effort is normal.   Abdominal:      General: There is no distension. Musculoskeletal:      Cervical back: Neck supple. Skin:     General: Skin is warm. Neurological:      Mental Status: He is alert. Mental status is at baseline. Medical problems and test results were reviewed with the patient today. No results found for this or any previous visit (from the past 672 hour(s)). No results found for: CHOL, CHOLPOCT, CHOLX, CHLST, CHOLV, HDL, HDLPOC, HDLC, LDL, LDLC, VLDLC, VLDL, TGLX, TRIGL    No results found for any visits on 05/11/23.         GAURI Montes was seen today for coronary artery disease and annual exam.    Diagnoses and all orders for this

## 2023-05-25 ENCOUNTER — TELEPHONE (OUTPATIENT)
Age: 43
End: 2023-05-25

## 2023-05-30 NOTE — TELEPHONE ENCOUNTER
Specialty Prior Auth Depart [phone] to check the status of the NEW PA submitted on 5/24/23. I spoke to [employer], Pharmacist. She said the NEW PA was also denied & has gone to [facility]. She states it was denied because the pt didn't have a reaction ?side effect from the Praluent. She said they received all the documentation showing that it's INEFFECTIVE but states being ineffective does not meet the guidelines for approval. She asked about doing a Peer to Peer. I stated a peer to peer Metta Mom not change the fact the medication is ineffective. I told her they have Dr Octavio Alicea note stating it is ineffective therefore, it needs to be changed to [address]. I also stated as of July 1, 2023, [address] will be preferred over Praluent. She states she will try to get another set of eyes to take a look at this to see if anything can be done. She will call me back this afternoon.

## 2023-05-31 NOTE — TELEPHONE ENCOUNTER
I spoke to [facility] [employer] [facility]. She said she reached out to the [facility] department. The only way Repatha will be approved is if he is intolerant to it. The fact that it's ineffective does not meet the criteria. She said Dr. Lauren Sheppard can request a Peer to Peer if he wants. At that point, they can schedule the Peer to Peer with him.      I told her we will discuss with him on Friday & get back to her if he wants to do the Peer to Peer or have the patient wait until July 1 when Oscar Abrams becomes preferred for [facility] routed to [place]

## 2023-05-31 NOTE — TELEPHONE ENCOUNTER
Wants to FU on the denial Please call   [facility] [phone] on Repatha PA     [facility] [employer] [facility] said she spoke you yesterday

## 2023-06-01 ENCOUNTER — TELEPHONE (OUTPATIENT)
Age: 43
End: 2023-06-01

## 2023-06-01 NOTE — TELEPHONE ENCOUNTER
Spoke with patient. Discussed Repatha denial. Informed patient that in July [facility] preferred will be Repatha instead of Praluent. Informed patient that we will send in the [address] [place] [address] Avenue again in July. Patient will continue Praluent until then.

## 2023-06-01 NOTE — TELEPHONE ENCOUNTER
----- Message from Delfino Cornell MD sent at 5/31/2023 10:49 AM EDT -----  Please let the patient know his most recent monitor showed a slow heart rate but no major arrhythmia. No further action is needed at this time.

## 2023-06-06 ENCOUNTER — TELEPHONE (OUTPATIENT)
Age: 43
End: 2023-06-06

## 2023-06-06 NOTE — TELEPHONE ENCOUNTER
See not from [facility] 6-1-23.  Repatha to be approved in July/brendab    ----- Message from Vishal Padilla sent at 6/1/2023  9:25 AM EDT -----  Send Repatha to [facility]. Sundar Tavarez should be preferred

## 2023-06-08 ENCOUNTER — TELEPHONE (OUTPATIENT)
Age: 43
End: 2023-06-08

## 2023-06-08 NOTE — TELEPHONE ENCOUNTER
PA for Kassy Rushing was denied, we are waiting until July 1 when Kassy Rushing will be preferred for [facility]. In the meantime, pt will continue Praluent. Praluent however needs a PA as well. I received a fax from [facility] stating a PA is required for Praluent. I assume it is old PA is running out 7/1/23 but I will submit the PA for Praluent just in case he needs it before changing to Kassy Rushing. Pt was notified of this information.      Form completed & faxed to [id number] [facility]

## 2023-08-14 NOTE — TELEPHONE ENCOUNTER
MEDICATION REFILL REQUEST      Name of Medication:  Praluent   Dose:    Frequency:    Quantity:    Days' supply:  80      Pharmacy Name/Location:  [facility] in [place]

## 2023-08-14 NOTE — TELEPHONE ENCOUNTER
Requested Prescriptions     Pending Prescriptions Disp Refills    alirocumab (PRALUENT) 75 MG/ML SOAJ injection pen 6 Adjustable Dose Pre-filled Pen Syringe 3     Sig: Inject 1 mL into the skin every 14 days

## 2023-08-15 RX ORDER — ALIROCUMAB 75 MG/ML
75 INJECTION, SOLUTION SUBCUTANEOUS
Qty: 6 ADJUSTABLE DOSE PRE-FILLED PEN SYRINGE | Refills: 3 | Status: SHIPPED | OUTPATIENT
Start: 2023-08-15

## 2023-08-22 NOTE — TELEPHONE ENCOUNTER
MEDICATION REFILL REQUEST      Name of Medication:  Praulent  Dose:  75 mg  Frequency:  1 every 14 days  Quantity:  6  Days' supply:  80      Pharmacy Name/Location:  [id number]    [facility] ,he said he called over a week ago and has not heard anything the patient is needing his med  please call and get PA  an d [facility] and make katelynn this is what they need also please call pt

## 2023-08-24 NOTE — TELEPHONE ENCOUNTER
Patient is still waiting to hear from prior auth on Praluent  Do we brenna any samples of Praluent?  Aman Schreiber he has ried for a few weeks now to get an [facility] Please call

## 2023-08-29 NOTE — TELEPHONE ENCOUNTER
Called pt to see if he had picked up praulent. Pt hadn't picked it up. Called pharmacy to ask about PA, and they said that pt has to switch to repatha.      PCN: Joe Crimes: [id number]  Group: [id number]

## 2023-09-06 NOTE — TELEPHONE ENCOUNTER
Requested Prescriptions     Pending Prescriptions Disp Refills    Evolocumab 140 MG/ML SOAJ 2 Adjustable Dose Pre-filled Pen Syringe 11     Sig: Inject 1 pen  into the skin every 14 days

## 2024-05-31 ENCOUNTER — OFFICE VISIT (OUTPATIENT)
Age: 44
End: 2024-05-31
Payer: COMMERCIAL

## 2024-05-31 VITALS
BODY MASS INDEX: 33.93 KG/M2 | WEIGHT: 256 LBS | DIASTOLIC BLOOD PRESSURE: 80 MMHG | SYSTOLIC BLOOD PRESSURE: 136 MMHG | HEIGHT: 73 IN | HEART RATE: 54 BPM

## 2024-05-31 DIAGNOSIS — I10 ESSENTIAL (PRIMARY) HYPERTENSION: ICD-10-CM

## 2024-05-31 DIAGNOSIS — I25.10 CORONARY ARTERY DISEASE INVOLVING NATIVE HEART WITHOUT ANGINA PECTORIS, UNSPECIFIED VESSEL OR LESION TYPE: ICD-10-CM

## 2024-05-31 DIAGNOSIS — E78.5 HYPERLIPIDEMIA, UNSPECIFIED HYPERLIPIDEMIA TYPE: Primary | ICD-10-CM

## 2024-05-31 DIAGNOSIS — R00.1 BRADYCARDIA, UNSPECIFIED: ICD-10-CM

## 2024-05-31 PROCEDURE — 99214 OFFICE O/P EST MOD 30 MIN: CPT | Performed by: INTERNAL MEDICINE

## 2024-05-31 PROCEDURE — 93000 ELECTROCARDIOGRAM COMPLETE: CPT | Performed by: INTERNAL MEDICINE

## 2024-05-31 PROCEDURE — 3079F DIAST BP 80-89 MM HG: CPT | Performed by: INTERNAL MEDICINE

## 2024-05-31 PROCEDURE — 3075F SYST BP GE 130 - 139MM HG: CPT | Performed by: INTERNAL MEDICINE

## 2024-05-31 RX ORDER — NITROGLYCERIN 0.4 MG/1
0.4 TABLET SUBLINGUAL EVERY 5 MIN PRN
Qty: 25 TABLET | Refills: 1 | Status: SHIPPED | OUTPATIENT
Start: 2024-05-31

## 2024-05-31 ASSESSMENT — ENCOUNTER SYMPTOMS
EYE PAIN: 0
COUGH: 0
APHONIA: 0
STRIDOR: 0
ABDOMINAL PAIN: 0
NAIL CHANGES: 0

## 2024-05-31 NOTE — PROGRESS NOTES
EKG 12 Lead - Clinic Performed    Essential (primary) hypertension  -     EKG 12 Lead - Clinic Performed    Bradycardia, unspecified  -     EKG 12 Lead - Clinic Performed  -     Lipoprotein A (LPA); Future    Coronary artery disease involving native heart without angina pectoris, unspecified vessel or lesion type  -     EKG 12 Lead - Clinic Performed    Other orders  -     nitroGLYCERIN (NITROSTAT) 0.4 MG SL tablet; Place 1 tablet under the tongue every 5 minutes as needed for Chest pain      Return in about 1 year (around 5/31/2025).       Kem Mondragon MD  5/31/2024  1:47 PM

## 2024-06-04 LAB — LPA SERPL-SCNC: 62.8 NMOL/L

## 2024-06-04 NOTE — RESULT ENCOUNTER NOTE
Your LP(a) level was within normal limits.    Please let me know if you have additional questions or concerns.    Kem Mondragon MD

## 2024-06-28 ENCOUNTER — CLINICAL DOCUMENTATION (OUTPATIENT)
Age: 44
End: 2024-06-28

## 2024-06-28 NOTE — PROGRESS NOTES
Repatha SureClick 140MG/ML auto-injectors    Your PA request has been approved. Additional information will be provided in the approval communication. (Message [id number]). Authorization Expiration Date: June 28, 2025.

## 2024-08-26 ENCOUNTER — PATIENT MESSAGE (OUTPATIENT)
Age: 44
End: 2024-08-26

## 2024-09-03 NOTE — TELEPHONE ENCOUNTER
Requested Prescriptions     Pending Prescriptions Disp Refills    Evolocumab 140 MG/ML SOAJ 2 Adjustable Dose Pre-filled Pen Syringe 11     Sig: Inject 140 mLs into the skin every 14 days         Last Ov 5/31/24 reviewed and medication reviewed.

## 2024-09-03 NOTE — TELEPHONE ENCOUNTER
MEDICATION REFILL REQUEST      Name of Medication:  Repatha  Dose:  140 mg  Frequency:  Every 14 days  Quantity:  2  Days' supply:  30      [id number]

## 2025-01-13 DIAGNOSIS — E78.5 HYPERLIPIDEMIA: Primary | ICD-10-CM

## 2025-01-13 DIAGNOSIS — I25.10 CORONARY ARTERY DISEASE INVOLVING NATIVE HEART: ICD-10-CM

## 2025-01-13 RX ORDER — ATORVASTATIN CALCIUM 10 MG/1
10 TABLET, FILM COATED ORAL DAILY
Qty: 90 TABLET | Refills: 1 | Status: SHIPPED | OUTPATIENT
Start: 2025-01-13

## 2025-01-13 RX ORDER — NITROGLYCERIN 0.4 MG/1
0.4 TABLET SUBLINGUAL EVERY 5 MIN PRN
Qty: 25 TABLET | Refills: 1 | Status: SHIPPED | OUTPATIENT
Start: 2025-01-13

## 2025-01-13 NOTE — TELEPHONE ENCOUNTER
Requested Prescriptions     Pending Prescriptions Disp Refills    atorvastatin (LIPITOR) 10 MG tablet 90 tablet 1     Sig: Take 1 tablet by mouth daily    Evolocumab 140 MG/ML SOAJ 2 Adjustable Dose Pre-filled Pen Syringe 2     Sig: Inject 1 pen  into the skin every 14 days    nitroGLYCERIN (NITROSTAT) 0.4 MG SL tablet 25 tablet 1     Sig: Place 1 tablet under the tongue every 5 minutes as needed for Chest pain

## 2025-01-13 NOTE — TELEPHONE ENCOUNTER
Pt left message asking us to call in all of his heart meds to his new Pharmacy [phone], Please call in asap

## 2025-01-17 NOTE — TELEPHONE ENCOUNTER
MEDICATION REFILL REQUEST      Name of Medication:  REpatha  Dose:  140 mg  Frequency:  Every 14 day   Quantity:    Days' supply:  90 day       Pharmacy Name/Location:  [facility]

## 2025-01-17 NOTE — TELEPHONE ENCOUNTER
Requested Prescriptions     Pending Prescriptions Disp Refills    Evolocumab 140 MG/ML SOAJ 2 Adjustable Dose Pre-filled Pen Syringe 4     Sig: Inject 140 mLs into the skin every 14 days

## 2025-01-20 ENCOUNTER — TELEPHONE (OUTPATIENT)
Age: 45
End: 2025-01-20

## 2025-01-20 NOTE — TELEPHONE ENCOUNTER
Repatha SureClick 140MG/ML auto-injectors    Attempted PA via Cover My Meds. Received the following message:      Prescriber does not match. You must be registered and on file with [place] [employer] to request prior authorizations electronically. Please call Provider services at [phone] option #7 to register. :[id number]:

## 2025-01-20 NOTE — TELEPHONE ENCOUNTER
Faxed the request to us for auth on Repatha have we received this an filled it out ? Please call or fax back to david

## 2025-01-22 ENCOUNTER — TELEPHONE (OUTPATIENT)
Age: 45
End: 2025-01-22

## 2025-01-22 NOTE — TELEPHONE ENCOUNTER
MEDICATION REFILL REQUEST      Name of Medication:  Repatha sureclick  Dose:  140  Frequency:  every 14 days  Quantity:  690      Pharmacy Name/Location:  [id number]    Pt needs a PA for this med sent over to [phone]    Any questions call pt

## 2025-02-04 ENCOUNTER — TELEPHONE (OUTPATIENT)
Age: 45
End: 2025-02-04

## 2025-02-04 NOTE — TELEPHONE ENCOUNTER
[facility] called stating pt needs a PA for Repatha. She said PA could be sent or alternate options are Lovastatin and Atorvastatin that would not need a PA.

## 2025-02-06 NOTE — TELEPHONE ENCOUNTER
I spoke with the pt and let him know about the copay. Pt will stop by the [place] office tomorrow to  the card.

## 2025-02-06 NOTE — TELEPHONE ENCOUNTER
Called [employer] to obtain prescription coverage for patient. Spoke with Eula. Eula stated no PA required for Repatha. Prescription ready for  for a copayment of 150 dollars for a 3 month supply. Patient is able to use Repatha copay card to reduce cost.

## 2025-05-19 ENCOUNTER — OFFICE VISIT (OUTPATIENT)
Age: 45
End: 2025-05-19
Payer: COMMERCIAL

## 2025-05-19 VITALS
DIASTOLIC BLOOD PRESSURE: 88 MMHG | HEIGHT: 73 IN | SYSTOLIC BLOOD PRESSURE: 120 MMHG | BODY MASS INDEX: 34.99 KG/M2 | WEIGHT: 264 LBS | HEART RATE: 61 BPM

## 2025-05-19 DIAGNOSIS — I25.10 ASCVD (ARTERIOSCLEROTIC CARDIOVASCULAR DISEASE): Primary | ICD-10-CM

## 2025-05-19 DIAGNOSIS — I25.10 CORONARY ARTERY DISEASE INVOLVING NATIVE HEART WITHOUT ANGINA PECTORIS, UNSPECIFIED VESSEL OR LESION TYPE: ICD-10-CM

## 2025-05-19 DIAGNOSIS — I21.11 ST ELEVATION MYOCARDIAL INFARCTION INVOLVING RIGHT CORONARY ARTERY (HCC): ICD-10-CM

## 2025-05-19 PROCEDURE — 99214 OFFICE O/P EST MOD 30 MIN: CPT | Performed by: INTERNAL MEDICINE

## 2025-05-19 PROCEDURE — 93000 ELECTROCARDIOGRAM COMPLETE: CPT | Performed by: INTERNAL MEDICINE

## 2025-05-19 NOTE — PROGRESS NOTES
[address], [address]  [place] [address]  PHONE: [phone]    SUBJECTIVE:   Keshav Hong is a 45 y.o. male 1980   seen for a follow up visit regarding the following:     Chief Complaint   Patient presents with    Coronary Artery Disease         History of Present Illness  The patient is a 45-year-old individual with a medical history significant for atherosclerotic cardiovascular disease (ASCVD), bradycardia, and hyperlipidemia. The patient currently reports no symptoms, including angina or dyspnea. The patient maintains an active lifestyle, engaging in basketball 3-4 times per week and walking on non-basketball days. The patient has experienced recent weight gain, which is attributed to dietary changes following a transition from a juicing diet to a conventional diet subsequent to employment at [employer]. The patient expresses interest in weight loss strategies and inquires about the potential side effects of semaglutide (Ozempic).    SOCIAL HISTORY  - Works at [employer], previously in  at [employer]  - Plays basketball 3-4 times/week  - Walks on non-basketball days  - Previously followed a fruit juice diet        Interval history:       Results    Non-ST elevation myocardial infarction 01/2017.     Troponin 5.18, total occlusion of PDA, as well as clot extending into the proximal right coronary artery.  The patient underwent complicated PCI with multiple drug eluting stents placed in the proximal to distal RCA.      Left ventricular systolic function by echocardiogram 50 - 55%, no wall motion abnormalities, mild left ventricular dilation.      03/15/2018 dual antiplatelet therapy discontinued.  The patient is on aspirin monotherapy.     EKG Sinus  Rhythm  -First degree A-V block Jose D = 258 Anterolateral injury. Negative T-waves  -Possible  Anterior  Ischemia.     5/14/2021 EKG Sinus  Bradycardia  -First degree A-V block Jose D = 238Left axis. Lateral ST-elevation